# Patient Record
Sex: MALE | Race: WHITE | NOT HISPANIC OR LATINO | Employment: OTHER | URBAN - METROPOLITAN AREA
[De-identification: names, ages, dates, MRNs, and addresses within clinical notes are randomized per-mention and may not be internally consistent; named-entity substitution may affect disease eponyms.]

---

## 2017-03-02 ENCOUNTER — LAB REQUISITION (OUTPATIENT)
Dept: LAB | Facility: HOSPITAL | Age: 67
End: 2017-03-02

## 2017-03-02 DIAGNOSIS — M72.0 PALMAR FASCIAL FIBROMATOSIS: ICD-10-CM

## 2017-03-02 PROCEDURE — 88304 TISSUE EXAM BY PATHOLOGIST: CPT | Performed by: PLASTIC SURGERY

## 2017-03-21 LAB
CYTO UR: NORMAL
LAB AP CASE REPORT: NORMAL
LAB AP CLINICAL INFORMATION: NORMAL
Lab: NORMAL
PATH REPORT.ADDENDUM SPEC: NORMAL
PATH REPORT.FINAL DX SPEC: NORMAL
PATH REPORT.GROSS SPEC: NORMAL

## 2017-08-22 ENCOUNTER — OUTSIDE FACILITY SERVICE (OUTPATIENT)
Dept: GASTROENTEROLOGY | Facility: CLINIC | Age: 67
End: 2017-08-22

## 2017-08-22 PROCEDURE — G0500 MOD SEDAT ENDO SERVICE >5YRS: HCPCS | Performed by: INTERNAL MEDICINE

## 2017-08-22 PROCEDURE — G0105 COLORECTAL SCRN; HI RISK IND: HCPCS | Performed by: INTERNAL MEDICINE

## 2018-11-15 ENCOUNTER — OFFICE VISIT (OUTPATIENT)
Dept: ORTHOPEDIC SURGERY | Facility: CLINIC | Age: 68
End: 2018-11-15

## 2018-11-15 VITALS — HEART RATE: 65 BPM | HEIGHT: 68 IN | WEIGHT: 160.5 LBS | OXYGEN SATURATION: 98 % | BODY MASS INDEX: 24.32 KG/M2

## 2018-11-15 DIAGNOSIS — M17.12 PRIMARY OSTEOARTHRITIS OF LEFT KNEE: ICD-10-CM

## 2018-11-15 DIAGNOSIS — M25.562 ACUTE PAIN OF LEFT KNEE: Primary | ICD-10-CM

## 2018-11-15 DIAGNOSIS — M25.562 LEFT KNEE PAIN, UNSPECIFIED CHRONICITY: ICD-10-CM

## 2018-11-15 PROCEDURE — 20610 DRAIN/INJ JOINT/BURSA W/O US: CPT | Performed by: ORTHOPAEDIC SURGERY

## 2018-11-15 PROCEDURE — 99204 OFFICE O/P NEW MOD 45 MIN: CPT | Performed by: ORTHOPAEDIC SURGERY

## 2018-11-15 RX ORDER — LISINOPRIL 10 MG/1
10 TABLET ORAL DAILY
COMMUNITY
End: 2022-04-28

## 2018-11-15 RX ORDER — POTASSIUM CITRATE 10 MEQ/1
10 TABLET, EXTENDED RELEASE ORAL
COMMUNITY
End: 2019-08-29 | Stop reason: DRUGHIGH

## 2018-11-15 RX ORDER — TRIAMCINOLONE ACETONIDE 40 MG/ML
40 INJECTION, SUSPENSION INTRA-ARTICULAR; INTRAMUSCULAR
Status: COMPLETED | OUTPATIENT
Start: 2018-11-15 | End: 2018-11-15

## 2018-11-15 RX ORDER — LIDOCAINE HYDROCHLORIDE 10 MG/ML
3 INJECTION, SOLUTION INFILTRATION; PERINEURAL
Status: COMPLETED | OUTPATIENT
Start: 2018-11-15 | End: 2018-11-15

## 2018-11-15 RX ORDER — MELOXICAM 7.5 MG/1
7.5 TABLET ORAL DAILY
COMMUNITY

## 2018-11-15 RX ADMIN — TRIAMCINOLONE ACETONIDE 40 MG: 40 INJECTION, SUSPENSION INTRA-ARTICULAR; INTRAMUSCULAR at 15:17

## 2018-11-15 RX ADMIN — LIDOCAINE HYDROCHLORIDE 3 ML: 10 INJECTION, SOLUTION INFILTRATION; PERINEURAL at 15:17

## 2018-11-15 NOTE — PROGRESS NOTES
Procedure   Large Joint Arthrocentesis: L knee  Date/Time: 11/15/2018 3:17 PM  Consent given by: patient  Site marked: site marked  Timeout: Immediately prior to procedure a time out was called to verify the correct patient, procedure, equipment, support staff and site/side marked as required   Supporting Documentation  Indications: pain   Procedure Details  Location: knee - L knee  Preparation: Patient was prepped and draped in the usual sterile fashion  Needle size: 25 G  Approach: anterolateral  Medications administered: 3 mL lidocaine 1 %; 40 mg triamcinolone acetonide 40 MG/ML  Patient tolerance: patient tolerated the procedure well with no immediate complications

## 2018-11-15 NOTE — PROGRESS NOTES
[facility] [place] [facility] Note    Subjective     Pain of the Left Knee (Left knee. Pain for a few months. Tennis injury. Pain stays at knee. Pain with movements, sharp pain when running or playing tennis. )      CHINEDU    Roque Bob is a 68 y.o. male.  Patient is a 68-year-old male well known to me from our relationship regarding his shoulders.  He is here for new problem today regarding his left knee.  This occurred after playing tennis a couple of times after not playing all summer.  He has had difficulty on the medial side of the knee.  He has difficulty with extension and with jogging.  He is tried anti-inflammatories without a lot of relief.  He denies obvious mechanical disruption.  He does have enough knee pain that he has not been able to play tennis comfortably.  He's here for further evaluation and treatment at the request of his PCP Dr. Rafael Jauregui.     History reviewed. No pertinent past medical history.   Past Surgical History:   Procedure Laterality Date   • PROSTATE SURGERY     • ROTATOR CUFF REPAIR        Family History   Problem Relation Age of Onset   • Stroke Mother    • Stroke Father      Social History     Socioeconomic History   • Marital status: Single     Spouse name: Not on file   • Number of children: Not on file   • Years of education: Not on file   • Highest education level: Not on file   Social Needs   • Financial resource strain: Not on file   • Food insecurity - worry: Not on file   • Food insecurity - inability: Not on file   • Transportation needs - medical: Not on file   • Transportation needs - non-medical: Not on file   Occupational History   • Not on file   Tobacco Use   • Smoking status: Never Smoker   • Smokeless tobacco: Never Used   Substance and Sexual Activity   • Alcohol use: No     Frequency: Never   • Drug use: No   • Sexual activity: Defer   Other Topics Concern   • Not on file   Social History Narrative   • Not on file      Current Outpatient Medications on  "File Prior to Visit   Medication Sig Dispense Refill   • lisinopril (PRINIVIL,ZESTRIL) 10 MG tablet Take 10 mg by mouth Daily.     • meloxicam (MOBIC) 7.5 MG tablet Take 7.5 mg by mouth Daily.     • potassium citrate (UROCIT-K) 10 MEQ (1080 MG) CR tablet Take 10 mEq by mouth 3 (Three) Times a Day With Meals.       No current facility-administered medications on file prior to visit.       No Known Allergies     The following portions of the patient's history were reviewed and updated as appropriate: allergies, current medications, past family history, past medical history, past social history, past surgical history and problem list.    Review of Systems   Constitutional: Positive for activity change.   Musculoskeletal: Positive for arthralgias (left knee) and gait problem.   Skin: Negative.    Allergic/Immunologic: Negative.    Hematological: Negative.    Psychiatric/Behavioral: Negative.         Objective      Physical Exam  Pulse 65   Ht 172.7 cm (68\")   Wt 72.8 kg (160 lb 7.9 oz)   SpO2 98%   BMI 24.40 kg/m²     Body mass index is 24.4 kg/m².    General  Mental Status - alert  General Appearance - cooperative, well groomed, not in acute distress  Orientation - Oriented X3  Build & Nutrition - well developed and well nourished  Posture - normal posture  Gait - normal gait     Integumentary  Global Assessment  Examination of related systems reveals - no lymphadenopathy  Ears:  No abnormality  Nose:  No mucous drainage  General Characteristics  Overall examination of the patient's skin reveals - no rashes, no evidence of scars, no suspicious lesions and no bruises.  Color - normal coloration of skin.  Vascular: Brisk capillary refill in all extremities    Ortho Exam  Peripheral Vascular:    Upper Extremity:   Inspection:  Left--no cyanotic nail beds Right--no cyanotic nail beds   Bilateral:  Pink nail beds with brisk capillary refill   Palpation:  Bilateral radial pulse normal    Musculoskeletal:  Global " Assessment:  Overall assessment of Lower Extremity Muscle Strength and Tone:  Left quadriceps--5/5   Left hamstrings--5/5       Left tibialis anterior--5/5  Left gastroc-soleus--5/5  Left EHL --5/5    Lower Extremity:  Knee/Patella:  No digital clubbing or cyanosis.    Examination of left knee reveals:  Normal deep tendon reflexes, coordination, strength, tone, sensation.  No known fractures or deformities.    Inspection and Palpation:  Left knee:  Tenderness:  Over the medial joint line and moderate severity  Effusion:  1+  Crepitus:  Positive  Pulses:  2+  Ecchymosis:  None  Warmth:  None     ROM:  Right:  Extension: 5    Flexion: 120   Left:  Extension: 5     Flexion:120    Instability:    Left:  Lachman Test:  Negative, Varus stress test negative, Valgus stress test negative    Deformities/Malalignments/Discrepancies:    Left:  Genu Varum   Right:  No deformity    Functional Testing:  Rosa's test:  Negative  Patella grind test:  Positive  Q-angle:  normal    Imaging/Studies  Imaging Results (last 24 hours)     Procedure Component Value Units Date/Time    XR Knee 3+ View With Toone Left [[id number]] Resulted:  11/15/18 1542     Updated:  11/15/18 1543    Narrative:       Knee X-Ray    Indication: Pain    Study:  Upright AP, Lateral, and Sunrise views of Left knee(s)    Comparison: None    Findings:    Patient appears to have mild to early moderate degenerative changes in the   medial compartment.  There are mild degenerative changes in the lateral   compartment.  There are mild to early moderate changes in the   patellofemoral compartment.  Patient has overall varus alignment.      Impression: Mild medial compartment and patellofemoral compartment   osteoarthritis of the left knee.              Assessment:  1. Acute pain of left knee    2. Left knee pain, unspecified chronicity    3. Primary osteoarthritis of left knee        Plan:  1. Continue over-the-counter medication as needed for discomfort  2. We have  a long discussion with the patient about treatment options and alternatives.  We have agreed to try a diagnostic injection into the left knee of Kenalog to see if this responds.  If it does not, he very well may have a meniscal injury.  MRI will be necessary in that case.  We have offered a medial offloading brace and he has politely declined saying he has braces at home that he uses.  3. I will see Mr. Bob back in about 6 weeks and see how he is doing.      Medical Decision Making  Management Options : over-the-counter medicine and prescription/IM medicine  Data/Risk: radiology tests and independent visualization of imaging, lab tests, or EMG/NCV    Parish Head MD  11/15/18  7:59 PM

## 2018-12-18 ENCOUNTER — OFFICE VISIT (OUTPATIENT)
Dept: ORTHOPEDIC SURGERY | Facility: CLINIC | Age: 68
End: 2018-12-18

## 2018-12-18 VITALS — WEIGHT: 166.89 LBS | HEART RATE: 71 BPM | BODY MASS INDEX: 25.29 KG/M2 | OXYGEN SATURATION: 99 % | HEIGHT: 68 IN

## 2018-12-18 DIAGNOSIS — M17.12 PRIMARY OSTEOARTHRITIS OF LEFT KNEE: ICD-10-CM

## 2018-12-18 DIAGNOSIS — M25.562 LEFT KNEE PAIN, UNSPECIFIED CHRONICITY: ICD-10-CM

## 2018-12-18 DIAGNOSIS — M25.562 ACUTE PAIN OF LEFT KNEE: Primary | ICD-10-CM

## 2018-12-18 PROCEDURE — 99213 OFFICE O/P EST LOW 20 MIN: CPT | Performed by: ORTHOPAEDIC SURGERY

## 2018-12-18 NOTE — PROGRESS NOTES
"    [facility] [place] [facility] [place]    Subjective     CC: Follow-up (Left knee. 6 week follow up. Last cortisone injection 11/5/18)      HPI    Roque Bob is a 68 y.o. male.  Patient's here for follow-up after left knee injection was given 11/5/2018.  He says he's slightly better but continues to struggle with certain activities.  He continues to play tennis but has not done a lot of jogging yet.       ROS:    Constiutional:Pt denies fever, chills, nausea, or vomiting.  MSK:as above    Objective      Past Medical History  History reviewed. No pertinent past medical history.      Physical Exam  Pulse 71   Ht 172.7 cm (68\")   Wt 75.7 kg (166 lb 14.2 oz)   SpO2 99%   BMI 25.38 kg/m²     Body mass index is 25.38 kg/m².    Patient is well nourished and well developed.        Ortho Exam  Patient has palpable osteophytes medially  Direct medial joint line tenderness and tenderness of the anterior medial femoral condyle  Negative Rosa  1+ effusion    Assessment:  1. Acute pain of left knee    2. Left knee pain, unspecified chronicity    3. Primary osteoarthritis of left knee        Plan:  1. Recommend over the counter anti-inflammatories for pain and/or swelling  2. MRI the left knee will be requested to further evaluate the menisci and condition of the articular cartilage  3. Follow-up to review that study.  We can call the patient with the results of the desires.  He is leaving town for about a month but we may be able to guide treatment when he returns.      Medical Decision Making  Management Options : over-the-counter medicine  Data/Risk: radiology tests    Parish Head MD  12/18/18  7:11 PM  "

## 2018-12-19 ENCOUNTER — HOSPITAL ENCOUNTER (OUTPATIENT)
Dept: MRI IMAGING | Facility: HOSPITAL | Age: 68
Discharge: HOME | End: 2018-12-19
Attending: ORTHOPAEDIC SURGERY | Admitting: ORTHOPAEDIC SURGERY

## 2018-12-19 DIAGNOSIS — M17.12 PRIMARY OSTEOARTHRITIS OF LEFT KNEE: ICD-10-CM

## 2018-12-19 DIAGNOSIS — M25.562 LEFT KNEE PAIN, UNSPECIFIED CHRONICITY: ICD-10-CM

## 2018-12-19 DIAGNOSIS — M25.562 ACUTE PAIN OF LEFT KNEE: ICD-10-CM

## 2018-12-19 PROCEDURE — 73721 MRI JNT OF LWR EXTRE W/O DYE: CPT

## 2018-12-20 ENCOUNTER — OFFICE VISIT (OUTPATIENT)
Dept: ORTHOPEDIC SURGERY | Facility: CLINIC | Age: 68
End: 2018-12-20

## 2018-12-20 VITALS — WEIGHT: 166.89 LBS | OXYGEN SATURATION: 99 % | BODY MASS INDEX: 25.29 KG/M2 | HEART RATE: 72 BPM | HEIGHT: 68 IN

## 2018-12-20 DIAGNOSIS — M17.12 PRIMARY OSTEOARTHRITIS OF LEFT KNEE: ICD-10-CM

## 2018-12-20 DIAGNOSIS — S83.242D TEAR OF MEDIAL MENISCUS OF LEFT KNEE, CURRENT, UNSPECIFIED TEAR TYPE, SUBSEQUENT ENCOUNTER: ICD-10-CM

## 2018-12-20 DIAGNOSIS — M25.562 ACUTE PAIN OF LEFT KNEE: Primary | ICD-10-CM

## 2018-12-20 PROCEDURE — 99213 OFFICE O/P EST LOW 20 MIN: CPT | Performed by: ORTHOPAEDIC SURGERY

## 2018-12-20 NOTE — PROGRESS NOTES
"    [facility] [place] [facility] [place]    Subjective     CC: Follow-up (MRI Left Knee 12/19/18)      HPI    Roque Bob is a 68 y.o. male.  Patient returns after the MRI of his left knee.       ROS:    Constiutional:Pt denies fever, chills, nausea, or vomiting.  MSK:as above    Objective      Past Medical History  History reviewed. No pertinent past medical history.      Physical Exam  Pulse 72   Ht 172.7 cm (67.99\")   Wt 75.7 kg (166 lb 14.2 oz)   SpO2 99%   BMI 25.38 kg/m²     Body mass index is 25.38 kg/m².    Patient is well nourished and well developed.        Ortho Exam  Palpable osteophytes medially  Tenderness at the anterior medial femoral condyle and direct medial joint line  Negative Rosa  1+ effusion    Imaging/Labs/EMG Reviewed:  Review the MRI of the patient's left knee from 12/19/2018 is reviewed through [facility].  Patient has significant edema in the medial femoral condyle and to a lesser degree the medial tibial plateau.  There is complete loss of cartilage on the medial femoral condyle.  There is a degenerative appearing medial meniscal tear.  There is a bone cyst in the posterior medial tibial plateau.    Assessment:  1. Acute pain of left knee    2. Primary osteoarthritis of left knee        Plan:  1. Recommend over the counter anti-inflammatories for pain and/or swelling  2. With a long discussion with the patient today about treatment options and alternatives.  At this point, we will recommend a course of viscosupplementation.  We will attempt to get that approved in the near future.  3. Custom versus over-the-counter medial compartment offloading brace will be prescribed for him to wear during athletic endeavors  4. Follow-up to begin Visco #1      Medical Decision Making  Management Options : over-the-counter medicine and prescription/IM medicine  Data/Risk: radiology tests and independent visualization of imaging, lab tests, or EMG/NCV    Parish Head MD  12/20/18  6:28 " PM

## 2019-02-14 ENCOUNTER — CLINICAL SUPPORT (OUTPATIENT)
Dept: ORTHOPEDIC SURGERY | Facility: CLINIC | Age: 69
End: 2019-02-14

## 2019-02-14 DIAGNOSIS — M25.562 ACUTE PAIN OF LEFT KNEE: ICD-10-CM

## 2019-02-14 DIAGNOSIS — M17.12 PRIMARY OSTEOARTHRITIS OF LEFT KNEE: Primary | ICD-10-CM

## 2019-02-14 PROCEDURE — 20610 DRAIN/INJ JOINT/BURSA W/O US: CPT | Performed by: ORTHOPAEDIC SURGERY

## 2019-02-14 RX ORDER — LIDOCAINE HYDROCHLORIDE 10 MG/ML
3 INJECTION, SOLUTION INFILTRATION; PERINEURAL
Status: COMPLETED | OUTPATIENT
Start: 2019-02-14 | End: 2019-02-14

## 2019-02-14 RX ADMIN — LIDOCAINE HYDROCHLORIDE 3 ML: 10 INJECTION, SOLUTION INFILTRATION; PERINEURAL at 14:25

## 2019-02-14 NOTE — PROGRESS NOTES
Patient is here for Orthovisc No. 1 to the left knee.  This was injected through a superolateral approach and tolerated well.  Follow-up in one week for Orthovisc No. 2.

## 2019-02-14 NOTE — PROGRESS NOTES
Procedure   Large Joint Arthrocentesis: L knee  Date/Time: 2/14/2019 2:25 PM  Consent given by: patient  Site marked: site marked  Timeout: Immediately prior to procedure a time out was called to verify the correct patient, procedure, equipment, support staff and site/side marked as required   Supporting Documentation  Indications: pain   Procedure Details  Location: knee - L knee  Preparation: Patient was prepped and draped in the usual sterile fashion  Needle size: 22 G  Approach: anterolateral  Medications administered: 30 mg Hyaluronan 30 MG/2ML; 3 mL lidocaine 1 %  Patient tolerance: patient tolerated the procedure well with no immediate complications

## 2019-02-21 ENCOUNTER — CLINICAL SUPPORT (OUTPATIENT)
Dept: ORTHOPEDIC SURGERY | Facility: CLINIC | Age: 69
End: 2019-02-21

## 2019-02-21 DIAGNOSIS — M17.12 PRIMARY OSTEOARTHRITIS OF LEFT KNEE: Primary | ICD-10-CM

## 2019-02-21 PROCEDURE — 20610 DRAIN/INJ JOINT/BURSA W/O US: CPT | Performed by: ORTHOPAEDIC SURGERY

## 2019-02-21 RX ORDER — LIDOCAINE HYDROCHLORIDE 10 MG/ML
3 INJECTION, SOLUTION INFILTRATION; PERINEURAL
Status: COMPLETED | OUTPATIENT
Start: 2019-02-21 | End: 2019-02-21

## 2019-02-21 RX ADMIN — LIDOCAINE HYDROCHLORIDE 3 ML: 10 INJECTION, SOLUTION INFILTRATION; PERINEURAL at 11:44

## 2019-02-21 NOTE — PROGRESS NOTES
Procedure   Large Joint Arthrocentesis: L knee  Date/Time: 2/21/2019 11:44 AM  Consent given by: patient  Site marked: site marked  Timeout: Immediately prior to procedure a time out was called to verify the correct patient, procedure, equipment, support staff and site/side marked as required   Supporting Documentation  Indications: pain   Procedure Details  Location: knee - L knee  Preparation: Patient was prepped and draped in the usual sterile fashion  Needle size: 22 G  Medications administered: 30 mg Hyaluronan 30 MG/2ML; 3 mL lidocaine 1 %

## 2019-02-21 NOTE — PROGRESS NOTES
Patient is here for Orthovisc No. 2 to the left knee.  This was injected through a superolateral approach and tolerated well.  Follow-up in 1 week for Orthovisc No. 3.  Patient has yet to see dramatic relief from Orthovisc No. 1.

## 2019-02-28 ENCOUNTER — CLINICAL SUPPORT (OUTPATIENT)
Dept: ORTHOPEDIC SURGERY | Facility: CLINIC | Age: 69
End: 2019-02-28

## 2019-02-28 DIAGNOSIS — M17.12 PRIMARY OSTEOARTHRITIS OF LEFT KNEE: Primary | ICD-10-CM

## 2019-02-28 PROCEDURE — 20610 DRAIN/INJ JOINT/BURSA W/O US: CPT | Performed by: ORTHOPAEDIC SURGERY

## 2019-02-28 RX ORDER — LIDOCAINE HYDROCHLORIDE 10 MG/ML
3 INJECTION, SOLUTION INFILTRATION; PERINEURAL
Status: COMPLETED | OUTPATIENT
Start: 2019-02-28 | End: 2019-02-28

## 2019-02-28 RX ADMIN — LIDOCAINE HYDROCHLORIDE 3 ML: 10 INJECTION, SOLUTION INFILTRATION; PERINEURAL at 14:00

## 2019-02-28 NOTE — PROGRESS NOTES
Patient is here for Orthovisc No. 3 to the left knee.  This is injected there is superolateral approach.  Follow-up in 3 months to assess efficacy of the injection.  We have instructed the patient that it may take 6-8 weeks from his last injection to start seeing some benefit.

## 2019-02-28 NOTE — PROGRESS NOTES
Procedure   Large Joint Arthrocentesis: L knee  Date/Time: 2/28/2019 2:00 PM  Consent given by: patient  Site marked: site marked  Timeout: Immediately prior to procedure a time out was called to verify the correct patient, procedure, equipment, support staff and site/side marked as required   Supporting Documentation  Indications: pain   Procedure Details  Location: knee - L knee  Preparation: Patient was prepped and draped in the usual sterile fashion  Needle size: 22 G  Approach: anterolateral  Medications administered: 30 mg Hyaluronan 30 MG/2ML; 3 mL lidocaine 1 %  Patient tolerance: patient tolerated the procedure well with no immediate complications

## 2019-05-28 ENCOUNTER — OFFICE VISIT (OUTPATIENT)
Dept: ORTHOPEDIC SURGERY | Facility: CLINIC | Age: 69
End: 2019-05-28

## 2019-05-28 VITALS — BODY MASS INDEX: 24.36 KG/M2 | HEART RATE: 61 BPM | HEIGHT: 68 IN | OXYGEN SATURATION: 98 % | WEIGHT: 160.72 LBS

## 2019-05-28 DIAGNOSIS — M25.562 ACUTE PAIN OF LEFT KNEE: ICD-10-CM

## 2019-05-28 DIAGNOSIS — M17.12 PRIMARY OSTEOARTHRITIS OF LEFT KNEE: Primary | ICD-10-CM

## 2019-05-28 DIAGNOSIS — S83.242D TEAR OF MEDIAL MENISCUS OF LEFT KNEE, CURRENT, UNSPECIFIED TEAR TYPE, SUBSEQUENT ENCOUNTER: ICD-10-CM

## 2019-05-28 PROCEDURE — 99213 OFFICE O/P EST LOW 20 MIN: CPT | Performed by: ORTHOPAEDIC SURGERY

## 2019-05-28 NOTE — PROGRESS NOTES
"    [facility] [place] [facility] [place]    Subjective     CC: Follow-up (3 month follow up after Visco injections in Left knee )      CHINEDU Bob is a 69 y.o. male.  Patient returns the office today 3 months out following his Orthovisc series completion on 2/28/2019.  He says it took a while for the injection to help but over the last 3 to 4 weeks, he has felt more relief from the injection and any other time.  He continues to struggle when he plays tennis and change directions and jog.  He did not get much benefit from the over-the-counter medial offloading brace.      ROS:    Constiutional:Pt denies fever, chills, nausea, or vomiting.  MSK:as above    Objective      Past Medical History  No past medical history on file.      Physical Exam  Pulse 61   Ht 172.7 cm (68.01\")   Wt 72.9 kg (160 lb 11.5 oz)   SpO2 98%   BMI 24.43 kg/m²     Body mass index is 24.43 kg/m².    Patient is well nourished and well developed.        Ortho Exam  Peripheral Vascular:    Upper Extremity:   Inspection:  Left--no cyanotic nail beds Right--no cyanotic nail beds   Bilateral:  Pink nail beds with brisk capillary refill   Palpation:  Bilateral radial pulse normal    Musculoskeletal:  Global Assessment:  Overall assessment of Lower Extremity Muscle Strength and Tone:  Left quadriceps--5/5   Left hamstrings--5/5       Left tibialis anterior--5/5  Left gastroc-soleus--5/5  Left EHL --5/5    Lower Extremity:  Knee/Patella:  No digital clubbing or cyanosis.    Examination of left knee reveals:  Normal deep tendon reflexes, coordination, strength, tone, sensation.  No known fractures or deformities.    Inspection and Palpation:  Left knee:  Tenderness:  Over the medial joint line and moderate severity  Effusion:  1+  Crepitus:  Positive  Pulses:  2+  Ecchymosis:  None  Warmth:  None     ROM:  Right:  Extension: 5    Flexion: 120   Left:  Extension: 5     Flexion:120    Instability:    Left:  Lachman Test:  Negative, Varus " stress test negative, Valgus stress test negative    Deformities/Malalignments/Discrepancies:    Left:  Genu Varum   Right:  No deformity    Functional Testing:  Rosa's test:  Negative  Patella grind test:  Positive  Q-angle:  normal      Imaging/Labs/EMG Reviewed:  We have gone back and looked at the MRI and plain films of his knee and he has significant medial compartment disease with edema on both the femoral and tibial sides.  The medial meniscus is significantly degenerative as well.    Assessment:  1. Primary osteoarthritis of left knee    2. Acute pain of left knee    3. Tear of medial meniscus of left knee, current, unspecified tear type, subsequent encounter        Plan:  1. Recommend over the counter anti-inflammatories for pain and/or swelling  2. Osteoarthritis left knee--observe for now.  Disease is primarily medial compartment in location.  We discussed partial versus total knee arthroplasty.  We described and discussed the benefits of each.  We also discussed a custom medial offloading brace to help with exercise.  He will think about that.  Follow-up in 3 months time we will see how he is doing and whether or not repeat modalities need to be performed.  3. Medial meniscal tear left knee--observe for now.  We discussed briefly the possibility of a knee scope but do not think this will give him lasting relief as the arthritis is his likely main problem.  4. I spent 15 minutes face to face with the patient  with 10 minutes spent counseling on future treatment options including partial versus total knee arthroplasty versus arthroscopy.          Parish Head MD  05/28/19  12:43 PM

## 2019-08-29 ENCOUNTER — OFFICE VISIT (OUTPATIENT)
Dept: ORTHOPEDIC SURGERY | Facility: CLINIC | Age: 69
End: 2019-08-29

## 2019-08-29 VITALS — HEIGHT: 68 IN | BODY MASS INDEX: 24.32 KG/M2 | OXYGEN SATURATION: 99 % | HEART RATE: 77 BPM | WEIGHT: 160.5 LBS

## 2019-08-29 DIAGNOSIS — S83.242D TEAR OF MEDIAL MENISCUS OF LEFT KNEE, CURRENT, UNSPECIFIED TEAR TYPE, SUBSEQUENT ENCOUNTER: ICD-10-CM

## 2019-08-29 DIAGNOSIS — M17.12 PRIMARY OSTEOARTHRITIS OF LEFT KNEE: Primary | ICD-10-CM

## 2019-08-29 PROCEDURE — 99213 OFFICE O/P EST LOW 20 MIN: CPT | Performed by: ORTHOPAEDIC SURGERY

## 2019-08-29 RX ORDER — POTASSIUM CITRATE 15 MEQ/1
1 TABLET, EXTENDED RELEASE ORAL 2 TIMES DAILY
Refills: 11 | COMMUNITY
Start: 2019-07-03

## 2019-08-29 NOTE — PROGRESS NOTES
"    [facility] [place] [facility] [place]    Subjective     CC: Follow-up of the Left Knee (Primary Osteoarthritis of Left Knee )      HPI    Roque Bob is a 69 y.o. male.  Patient is here today to follow-up of the left knee arthritis.  His pain is worsened recently.  He still has point tenderness in the medial femoral condyle when he jogs in place tennis.  He has failed an Orthovisc series in terms getting meaningful relief.  Corticosteroid has helped him a little bit.  His last corticosteroid injection was November 2018.  His last Orthovisc injection was 2/28/2019.      ROS:    Constiutional:Pt denies fever, chills, nausea, or vomiting.  MSK:as above    Objective      Past Medical History  History reviewed. No pertinent past medical history.      Physical Exam  Pulse 77   Ht 172.7 cm (67.99\")   Wt 72.8 kg (160 lb 7.9 oz)   SpO2 99%   BMI 24.41 kg/m²     Body mass index is 24.41 kg/m².    Patient is well nourished and well developed.        Ortho Exam  Peripheral Vascular:    Upper Extremity:   Inspection:  Left--no cyanotic nail beds Right--no cyanotic nail beds   Bilateral:  Pink nail beds with brisk capillary refill   Palpation:  Bilateral radial pulse normal    Musculoskeletal:  Global Assessment:  Overall assessment of Lower Extremity Muscle Strength and Tone:  Left quadriceps--5/5   Left hamstrings--5/5       Left tibialis anterior--5/5  Left gastroc-soleus--5/5  Left EHL --5/5    Lower Extremity:  Knee/Patella:  No digital clubbing or cyanosis.    Examination of left knee reveals:  Normal deep tendon reflexes, coordination, strength, tone, sensation.  No known fractures or deformities.    Inspection and Palpation:  Left knee:  Tenderness:  Over the medial joint line and moderate severity  Effusion:  1+  Crepitus:  Positive  Pulses:  2+  Ecchymosis:  None  Warmth:  None     ROM:  Right:  Extension: 5    Flexion: 120   Left:  Extension: 5     Flexion:120    Instability:    Left:  Lachman Test:  " Negative, Varus stress test negative, Valgus stress test negative    Deformities/Malalignments/Discrepancies:    Left:  Genu Varum   Right:  No deformity    Functional Testing:  Rosa's test:  Negative  Patella grind test:  Positive  Q-angle:  normal      Imaging/Labs/EMG Reviewed:  None    Assessment:  1. Primary osteoarthritis of left knee    2. Tear of medial meniscus of left knee, current, unspecified tear type, subsequent encounter        Plan:  1. Recommend over the counter anti-inflammatories for pain and/or swelling  2. Arthritis left knee--patient will be preauthorized for a 5 shot Supartz series.  We will see him back for injection #1.  We briefly discussed the possibility of PRP injections in the future.  He is not interested in a medial compartment offloading brace that we have again discussed with him.  3. Degenerative medial meniscal tear--initially patient was felt to not be a great candidate for the surgery because of the significant degeneration in the medial compartment.  He is not interested in arthroplasty at this point so we will see how he does with the Visco supplement and if he fails to get meaningful relief, we can revisit the possibility of a left knee arthroscopy to clean up the medial sided degenerative structures.      Parish Head MD  08/29/19  10:32 AM

## 2019-09-12 ENCOUNTER — CLINICAL SUPPORT (OUTPATIENT)
Dept: ORTHOPEDIC SURGERY | Facility: CLINIC | Age: 69
End: 2019-09-12

## 2019-09-12 DIAGNOSIS — M17.12 PRIMARY OSTEOARTHRITIS OF LEFT KNEE: ICD-10-CM

## 2019-09-12 PROCEDURE — 20610 DRAIN/INJ JOINT/BURSA W/O US: CPT | Performed by: ORTHOPAEDIC SURGERY

## 2019-09-12 NOTE — PROGRESS NOTES
Procedure   Large Joint Arthrocentesis: L knee  Date/Time: 9/12/2019 9:17 AM  Consent given by: patient  Site marked: site marked  Timeout: Immediately prior to procedure a time out was called to verify the correct patient, procedure, equipment, support staff and site/side marked as required   Supporting Documentation  Indications: pain   Procedure Details  Location: knee - L knee  Preparation: Patient was prepped and draped in the usual sterile fashion  Needle size: 22 G  Approach: anterolateral  Medications administered: 25 mg sodium hyaluronate 25 MG/2.5ML; 3 mL lidocaine (cardiac)  Patient tolerance: patient tolerated the procedure well with no immediate complications

## 2019-09-12 NOTE — PROGRESS NOTES
Patient is here for Supartz No. 1 to the left knee.  This was injected through a superolateral approach and tolerated well.  Follow-up in 1 week for Supartz No. 2.

## 2019-09-19 ENCOUNTER — CLINICAL SUPPORT (OUTPATIENT)
Dept: ORTHOPEDIC SURGERY | Facility: CLINIC | Age: 69
End: 2019-09-19

## 2019-09-19 DIAGNOSIS — M17.12 PRIMARY OSTEOARTHRITIS OF LEFT KNEE: Primary | ICD-10-CM

## 2019-09-19 PROCEDURE — 20610 DRAIN/INJ JOINT/BURSA W/O US: CPT | Performed by: ORTHOPAEDIC SURGERY

## 2019-09-19 RX ORDER — LIDOCAINE HYDROCHLORIDE 10 MG/ML
3 INJECTION, SOLUTION EPIDURAL; INFILTRATION; INTRACAUDAL; PERINEURAL
Status: COMPLETED | OUTPATIENT
Start: 2019-09-19 | End: 2019-09-19

## 2019-09-19 RX ADMIN — LIDOCAINE HYDROCHLORIDE 3 ML: 10 INJECTION, SOLUTION EPIDURAL; INFILTRATION; INTRACAUDAL; PERINEURAL at 09:07

## 2019-09-19 NOTE — PROGRESS NOTES
Patient is here for Supartz No. 2 to the left knee.  This was injected through a superior lateral approach and tolerated well.  Follow-up in 1 week for Supartz No. 3.

## 2019-09-19 NOTE — PROGRESS NOTES
Procedure   Large Joint Arthrocentesis: L knee  Date/Time: 9/19/2019 9:07 AM  Consent given by: patient  Site marked: site marked  Timeout: Immediately prior to procedure a time out was called to verify the correct patient, procedure, equipment, support staff and site/side marked as required   Supporting Documentation  Indications: pain   Procedure Details  Location: knee - L knee  Preparation: Patient was prepped and draped in the usual sterile fashion  Needle size: 22 G  Approach: anterolateral  Medications administered: 25 mg sodium hyaluronate 25 MG/2.5ML; 3 mL lidocaine PF 1% 1 %  Patient tolerance: patient tolerated the procedure well with no immediate complications

## 2019-09-26 ENCOUNTER — CLINICAL SUPPORT (OUTPATIENT)
Dept: ORTHOPEDIC SURGERY | Facility: CLINIC | Age: 69
End: 2019-09-26

## 2019-09-26 DIAGNOSIS — M17.12 PRIMARY OSTEOARTHRITIS OF LEFT KNEE: Primary | ICD-10-CM

## 2019-09-26 PROCEDURE — 20610 DRAIN/INJ JOINT/BURSA W/O US: CPT | Performed by: ORTHOPAEDIC SURGERY

## 2019-09-26 RX ORDER — LIDOCAINE HYDROCHLORIDE 10 MG/ML
3 INJECTION, SOLUTION EPIDURAL; INFILTRATION; INTRACAUDAL; PERINEURAL
Status: COMPLETED | OUTPATIENT
Start: 2019-09-26 | End: 2019-09-26

## 2019-09-26 RX ADMIN — LIDOCAINE HYDROCHLORIDE 3 ML: 10 INJECTION, SOLUTION EPIDURAL; INFILTRATION; INTRACAUDAL; PERINEURAL at 09:15

## 2019-09-26 NOTE — PROGRESS NOTES
Patient is here for Supartz No. 3 to the left knee.  This was injected through a superolateral approach and tolerated well.  Follow-up in 1 week for Supartz No. 4.

## 2019-09-26 NOTE — PROGRESS NOTES
Procedure   Large Joint Arthrocentesis: L knee  Date/Time: 9/26/2019 9:15 AM  Consent given by: patient  Site marked: site marked  Timeout: Immediately prior to procedure a time out was called to verify the correct patient, procedure, equipment, support staff and site/side marked as required   Supporting Documentation  Indications: pain   Procedure Details  Location: knee - L knee  Preparation: Patient was prepped and draped in the usual sterile fashion  Needle size: 22 G  Approach: anterolateral  Medications administered: 25 mg sodium hyaluronate 25 MG/2.5ML; 3 mL lidocaine PF 1% 1 %  Patient tolerance: patient tolerated the procedure well with no immediate complications

## 2019-10-10 ENCOUNTER — CLINICAL SUPPORT (OUTPATIENT)
Dept: ORTHOPEDIC SURGERY | Facility: CLINIC | Age: 69
End: 2019-10-10

## 2019-10-10 DIAGNOSIS — M17.12 PRIMARY OSTEOARTHRITIS OF LEFT KNEE: Primary | ICD-10-CM

## 2019-10-10 DIAGNOSIS — S83.242D TEAR OF MEDIAL MENISCUS OF LEFT KNEE, CURRENT, UNSPECIFIED TEAR TYPE, SUBSEQUENT ENCOUNTER: ICD-10-CM

## 2019-10-10 PROCEDURE — 20610 DRAIN/INJ JOINT/BURSA W/O US: CPT | Performed by: ORTHOPAEDIC SURGERY

## 2019-10-10 NOTE — PROGRESS NOTES
Patient is here for Supartz No. 4 to the left knee.  This was injected through a superior lateral approach and tolerated well.  Patient tells me he was able to run and play tennis so he feels like the Supartz is helping him.  Follow-up in 1 week for the final Supartz injection.

## 2019-10-10 NOTE — PROGRESS NOTES
Procedure   Large Joint Arthrocentesis: L knee  Date/Time: 10/10/2019 9:43 AM  Consent given by: patient  Site marked: site marked  Timeout: Immediately prior to procedure a time out was called to verify the correct patient, procedure, equipment, support staff and site/side marked as required   Supporting Documentation  Indications: pain   Procedure Details  Location: knee - L knee  Preparation: Patient was prepped and draped in the usual sterile fashion  Needle size: 22 G  Approach: anterolateral  Medications administered: 25 mg sodium hyaluronate 25 MG/2.5ML  Patient tolerance: patient tolerated the procedure well with no immediate complications

## 2019-10-17 ENCOUNTER — CLINICAL SUPPORT (OUTPATIENT)
Dept: ORTHOPEDIC SURGERY | Facility: CLINIC | Age: 69
End: 2019-10-17

## 2019-10-17 DIAGNOSIS — M17.12 PRIMARY OSTEOARTHRITIS OF LEFT KNEE: Primary | ICD-10-CM

## 2019-10-17 PROCEDURE — 20610 DRAIN/INJ JOINT/BURSA W/O US: CPT | Performed by: ORTHOPAEDIC SURGERY

## 2019-10-17 RX ORDER — LIDOCAINE HYDROCHLORIDE 10 MG/ML
3 INJECTION, SOLUTION EPIDURAL; INFILTRATION; INTRACAUDAL; PERINEURAL
Status: COMPLETED | OUTPATIENT
Start: 2019-10-17 | End: 2019-10-17

## 2019-10-17 RX ADMIN — LIDOCAINE HYDROCHLORIDE 3 ML: 10 INJECTION, SOLUTION EPIDURAL; INFILTRATION; INTRACAUDAL; PERINEURAL at 09:12

## 2019-10-17 NOTE — PROGRESS NOTES
Patient is here for Supartz No. 5 to the left knee.  This was injected through a superior lateral approach and tolerated well.  Patient will follow-up in 6 weeks and will try making decision whether or not he wants to proceed with arthroscopy.

## 2019-10-17 NOTE — PROGRESS NOTES
Procedure   Large Joint Arthrocentesis: L knee  Date/Time: 10/17/2019 9:12 AM  Consent given by: patient  Site marked: site marked  Timeout: Immediately prior to procedure a time out was called to verify the correct patient, procedure, equipment, support staff and site/side marked as required   Supporting Documentation  Indications: pain   Procedure Details  Location: knee - L knee  Preparation: Patient was prepped and draped in the usual sterile fashion  Needle size: 22 G  Approach: anterolateral  Medications administered: 25 mg sodium hyaluronate 25 MG/2.5ML; 3 mL lidocaine PF 1% 1 %  Patient tolerance: patient tolerated the procedure well with no immediate complications

## 2019-12-05 ENCOUNTER — OFFICE VISIT (OUTPATIENT)
Dept: ORTHOPEDIC SURGERY | Facility: CLINIC | Age: 69
End: 2019-12-05

## 2019-12-05 VITALS — HEIGHT: 68 IN | HEART RATE: 69 BPM | BODY MASS INDEX: 24.16 KG/M2 | OXYGEN SATURATION: 99 % | WEIGHT: 159.4 LBS

## 2019-12-05 DIAGNOSIS — S83.242D TEAR OF MEDIAL MENISCUS OF LEFT KNEE, CURRENT, UNSPECIFIED TEAR TYPE, SUBSEQUENT ENCOUNTER: ICD-10-CM

## 2019-12-05 DIAGNOSIS — M17.12 PRIMARY OSTEOARTHRITIS OF LEFT KNEE: Primary | ICD-10-CM

## 2019-12-05 PROCEDURE — 99213 OFFICE O/P EST LOW 20 MIN: CPT | Performed by: ORTHOPAEDIC SURGERY

## 2019-12-05 NOTE — PROGRESS NOTES
"    [facility] [place] [facility] [place]    Subjective     CC: Follow-up (7 week f/u; Orthopedic Surgery Primary osteoarthritis of left knee (Supartz series completed 10/17/19))      CHINEDU Bob is a 69 y.o. male.  Patient is here today for follow-up now 7 weeks out from completion of a 5's shot Supartz series which helped him.  He still having difficulty on the medial side of the knee without significant mechanical disturbance.      ROS:    Constiutional:Pt denies fever, chills, nausea, or vomiting.  MSK:as above    Objective      Past Medical History  History reviewed. No pertinent past medical history.      Physical Exam  Pulse 69   Ht 172.7 cm (67.99\")   Wt 72.3 kg (159 lb 6.4 oz)   SpO2 99%   BMI 24.24 kg/m²     Body mass index is 24.24 kg/m².    Patient is well nourished and well developed.        Ortho Exam  Peripheral Vascular:    Upper Extremity:   Inspection:  Left--no cyanotic nail beds Right--no cyanotic nail beds   Bilateral:  Pink nail beds with brisk capillary refill   Palpation:  Bilateral radial pulse normal    Musculoskeletal:  Global Assessment:  Overall assessment of Lower Extremity Muscle Strength and Tone:  Left quadriceps--5/5   Left hamstrings--5/5       Left tibialis anterior--5/5  Left gastroc-soleus--5/5  Left EHL --5/5    Lower Extremity:  Knee/Patella:  No digital clubbing or cyanosis.    Examination of left knee reveals:  Normal deep tendon reflexes, coordination, strength, tone, sensation.  No known fractures or deformities.    Inspection and Palpation:  Left knee:  Tenderness:  Over the medial joint line and moderate severity  Effusion:  1+  Crepitus:  Positive  Pulses:  2+  Ecchymosis:  None  Warmth:  None     ROM:  Left:  Extension: 5     Flexion:120    Instability:    Left:  Lachman Test:  Negative, Varus stress test negative, Valgus stress test negative    Deformities/Malalignments/Discrepancies:    Left:  Genu Varum   Right:  No deformity    Functional " Testing:  Rosa's test:  Negative  Patella grind test:  Positive  Q-angle:  normal      Imaging/Labs/EMG Reviewed:  Imaging Results (Last 24 Hours)     ** No results found for the last 24 hours. **      Will come back and looked at the patient's MRI which can show significant degenerative changes in the medial compartment and an irregular medial meniscus.    Assessment:  1. Primary osteoarthritis of left knee    2. Tear of medial meniscus of left knee, current, unspecified tear type, subsequent encounter        Plan:  1. Recommend over the counter anti-inflammatories for pain and/or swelling  2. Left knee medial meniscal tear in the face of medial compartment arthritis--we have talked to the patient very frankly today about the benefits of arthroscopy and in terms of giving him long-term relief, I am not sure that this is enough.  We discussed ankit the possibility of medial compartment arthroplasty and I think that is a better surgery for him given his level of activity and demand (running and playing tennis).  I have asked him to see Dr. Fuller for consideration of medial compartment arthroplasty.  He will try to get this done sometime in the next few weeks and plan potentially for arthroplasty in March.      Parish Head MD  12/05/19  1:33 PM

## 2020-01-15 ENCOUNTER — TELEPHONE (OUTPATIENT)
Dept: ORTHOPEDIC SURGERY | Facility: CLINIC | Age: 70
End: 2020-01-15

## 2020-01-15 NOTE — TELEPHONE ENCOUNTER
PT CALLED THIS AFTERNOON TO LET DR. DA SILVA KNOW THAT HE HAS DECIDED TO HOLD OFF ON GETTING THE PARTIAL KNEE REPLACEMENT. HE WANTS TO DO ANOTHER ROUND OF SUPARTZ WHENEVER HE IS ELIGIBLE, WILL BE AROUND 4/17/2020.  I ADVISED PATIENT THAT WE WILL NEED TO GET IT AUTHORIZED WITH INSURANCE PRIOR TO SCHEDULING THOSE APPOINTMENTS. PATIENT SCHEDULED AN APPOINTMENT AT THE END OF MARCH AND THEN WILL PROCEED WITH GETTING THAT AUTHORIZED. HE JUST WANTED TO LET DR. DA SILVA KNOW THIS INFORMATION.

## 2020-03-17 DIAGNOSIS — M17.12 PRIMARY OSTEOARTHRITIS OF LEFT KNEE: Primary | ICD-10-CM

## 2020-03-17 DIAGNOSIS — S83.242D TEAR OF MEDIAL MENISCUS OF LEFT KNEE, CURRENT, UNSPECIFIED TEAR TYPE, SUBSEQUENT ENCOUNTER: ICD-10-CM

## 2020-03-30 ENCOUNTER — APPOINTMENT (OUTPATIENT)
Dept: MRI IMAGING | Facility: HOSPITAL | Age: 70
End: 2020-03-30

## 2020-04-01 ENCOUNTER — HOSPITAL ENCOUNTER (OUTPATIENT)
Dept: MRI IMAGING | Facility: HOSPITAL | Age: 70
Discharge: HOME | End: 2020-04-01
Admitting: ORTHOPAEDIC SURGERY

## 2020-04-01 DIAGNOSIS — S83.242D TEAR OF MEDIAL MENISCUS OF LEFT KNEE, CURRENT, UNSPECIFIED TEAR TYPE, SUBSEQUENT ENCOUNTER: ICD-10-CM

## 2020-04-01 DIAGNOSIS — M17.12 PRIMARY OSTEOARTHRITIS OF LEFT KNEE: ICD-10-CM

## 2020-04-01 PROCEDURE — 73721 MRI JNT OF LWR EXTRE W/O DYE: CPT

## 2020-04-02 ENCOUNTER — TELEPHONE (OUTPATIENT)
Dept: ORTHOPEDIC SURGERY | Facility: CLINIC | Age: 70
End: 2020-04-02

## 2020-04-02 NOTE — TELEPHONE ENCOUNTER
Left message for the patient to call me back at the office to schedule a telephone visit at 9:20 with Dr. Head tomorrow am.     Judy

## 2020-04-02 NOTE — TELEPHONE ENCOUNTER
PATIENT WAS RETURNING A CALL TO DR DA SILVA REGARDING MRI RESULTS. HE STATES HE RECEIVED A VOICEMAIL BUT NO DETAILS OF THE MRI WERE GIVEN. PATIENT CAN BE REACHED [phone].

## 2020-04-02 NOTE — TELEPHONE ENCOUNTER
Telephone encounter scheduled with the patient to get his MRI results - tomorrow at 9:20 with Dr. Head.    Judy

## 2020-04-02 NOTE — TELEPHONE ENCOUNTER
Please arrange a telephone visit (if he consents) for tomorrow am and I will review results with him.      Se BOUDREAUX

## 2020-04-03 ENCOUNTER — OFFICE VISIT (OUTPATIENT)
Dept: ORTHOPEDIC SURGERY | Facility: CLINIC | Age: 70
End: 2020-04-03

## 2020-04-03 DIAGNOSIS — S83.242D TEAR OF MEDIAL MENISCUS OF LEFT KNEE, CURRENT, UNSPECIFIED TEAR TYPE, SUBSEQUENT ENCOUNTER: ICD-10-CM

## 2020-04-03 DIAGNOSIS — M17.12 PRIMARY OSTEOARTHRITIS OF LEFT KNEE: Primary | ICD-10-CM

## 2020-04-03 PROCEDURE — 99213 OFFICE O/P EST LOW 20 MIN: CPT | Performed by: ORTHOPAEDIC SURGERY

## 2020-04-03 NOTE — PROGRESS NOTES
[facility] [place] [facility] [place]        Subjective     You have chosen to receive care through a telephone visit today. Do you consent to use a telephone visit for your medical care today? Yes      CC: Follow-up (4 month  MRI (4/1/20) recheck - Primary osteoarthritis of left knee)      HPI    Roque Bob is a 70 y.o. male.  Telephone visit has been scheduled for the patient to discuss the MRI results.  Patient continues to complain of medial sided knee pain that affects his ability to jog and play tennis.  He most recently in October 2019 completed a 5 shot Supartz series which helped him more than 3 shot series we have done in the past.      ROS:    Constiutional:Pt denies fever, chills, nausea, or vomiting.  MSK:as above        Objective      Past Medical History  History reviewed. No pertinent past medical history.      Telephone Visit Notes:    --Patient continues to have medial sided left knee pain.  --Patient expressed a desire to continue to be active and secondary to the condition of his knee that he can no longer do so  --He is failed bracing and injections and activity modification and anti-inflammatories  --Patient would like to pursue definitive management sometime in the fall.  --We discussed the findings of the MRI and reviewed the images and report ourselves as well.  Report results will be listed below.      FINDINGS: Extensor mechanism intact with quadriceps and patellar tendons  in normal limits. Patellofemoral joint space demonstrates patellar  retinaculum intact without high-grade chondromalacia despite  mild-to-moderate DJD including osteophytosis and minimal joint space  narrowing of cartilage thinning with only trace, if any, effusion  present in the suprapatellar region. No aggressive bone marrow signal of  the patellofemoral joint space with femorotibial joint spaces  demonstrating degenerative bone cysts noted as seen on prior along with  advanced degenerative  changes in the medial compartment where there is  joint space narrowing and cartilage thinning as well as irregularity of  erosions which have progressed from prior comparison 2018 with decreased  reactive edema seen at that time however. ACL and PCL intact. Lateral  meniscus intact. Medial meniscus has complex tearing posterior horn with  similar configuration to prior somewhat extruded into the medial gutter  with minimal adjacent edema. MCL intact. Lateral complex within normal  limits.     IMPRESSION:  Overall similar appearance to 2018 comparison examination  with persistent complex tearing posterior horn medial meniscus minimally  extruded into the medial gutter without abnormality of the adjacent  intact MCL however. Cartilage thinning in the medial compartment where  there is advanced degenerative disease including erosions and  degenerative bone cyst which are stable to slightly progressed from  prior comparison, however, decreased reactive edema in these regions of  the femoral and tibial margins from 2018 comparison. No new findings  otherwise identified.      D:  04/01/2020  E:  04/01/2020     This report was finalized on 4/1/2020 3:06 PM by Dr. Lawson Bear.      Assessment    Assessment:  1. Primary osteoarthritis of left knee    2. Tear of medial meniscus of left knee, current, unspecified tear type, subsequent encounter        Plan:  1. Recommend over the counter anti-inflammatories for pain and/or swelling  2. Left knee medial meniscal tear in the face of osteoarthritis--patient's medial compartment appears to be devoid of articular cartilage and there are few areas where there is bony edema.  Compared to the prior study in 2018, these edematous areas are less dramatic.  With that being said, we discussed the pros and cons of partial versus total knee arthroplasty.  He is considering getting this done in the fall.  We can go through the specifics when he returns for Supartz No. 1.  The soonest he  can have it is April 18, 2020.  Order will be placed for Celestine.  When he gets more serious about considering surgery, a full set of x-rays will be necessary as his last that was done in 2018.  I suspect that these will show significant advancement.        This visit has been rescheduled as a phone visit to comply with patient safety concerns in accordance with CDC recommendations. Total time of discussion was 17 minutes.      Parish Head MD  04/03/20  09:46

## 2020-05-21 ENCOUNTER — OFFICE VISIT (OUTPATIENT)
Dept: ORTHOPEDIC SURGERY | Facility: CLINIC | Age: 70
End: 2020-05-21

## 2020-05-21 DIAGNOSIS — M17.12 PRIMARY OSTEOARTHRITIS OF LEFT KNEE: Primary | ICD-10-CM

## 2020-05-21 PROCEDURE — 20610 DRAIN/INJ JOINT/BURSA W/O US: CPT | Performed by: ORTHOPAEDIC SURGERY

## 2020-05-21 RX ORDER — LIDOCAINE HYDROCHLORIDE 10 MG/ML
3 INJECTION, SOLUTION EPIDURAL; INFILTRATION; INTRACAUDAL; PERINEURAL
Status: COMPLETED | OUTPATIENT
Start: 2020-05-21 | End: 2020-05-21

## 2020-05-21 RX ADMIN — LIDOCAINE HYDROCHLORIDE 3 ML: 10 INJECTION, SOLUTION EPIDURAL; INFILTRATION; INTRACAUDAL; PERINEURAL at 14:19

## 2020-05-21 NOTE — PROGRESS NOTES
Patient is here for Orthovisc No. 1 to the left knee.  This was injected through a superolateral protein tolerated well.  Follow-up in 1 week for Orthovisc No. 2.

## 2020-05-21 NOTE — PROGRESS NOTES
Procedure   Large Joint Arthrocentesis: L knee  Date/Time: 5/21/2020 2:19 PM  Consent given by: patient  Site marked: site marked  Timeout: Immediately prior to procedure a time out was called to verify the correct patient, procedure, equipment, support staff and site/side marked as required   Supporting Documentation  Indications: pain   Procedure Details  Location: knee - L knee  Preparation: Patient was prepped and draped in the usual sterile fashion  Needle size: 22 G  Approach: superior  Medications administered: 25 mg sodium hyaluronate 25 MG/2.5ML; 3 mL lidocaine PF 1% 1 %  Aspirate: clear (to verify intraarticular placement of the needle)  Patient tolerance: patient tolerated the procedure well with no immediate complications

## 2020-05-28 ENCOUNTER — CLINICAL SUPPORT (OUTPATIENT)
Dept: ORTHOPEDIC SURGERY | Facility: CLINIC | Age: 70
End: 2020-05-28

## 2020-05-28 DIAGNOSIS — M17.12 PRIMARY OSTEOARTHRITIS OF LEFT KNEE: Primary | ICD-10-CM

## 2020-05-28 PROCEDURE — 20610 DRAIN/INJ JOINT/BURSA W/O US: CPT | Performed by: ORTHOPAEDIC SURGERY

## 2020-05-28 RX ORDER — LIDOCAINE HYDROCHLORIDE 10 MG/ML
3 INJECTION, SOLUTION EPIDURAL; INFILTRATION; INTRACAUDAL; PERINEURAL
Status: COMPLETED | OUTPATIENT
Start: 2020-05-28 | End: 2020-05-28

## 2020-05-28 RX ADMIN — LIDOCAINE HYDROCHLORIDE 3 ML: 10 INJECTION, SOLUTION EPIDURAL; INFILTRATION; INTRACAUDAL; PERINEURAL at 09:02

## 2020-05-28 NOTE — PROGRESS NOTES
Patient is here for Supartz No. 2 to the left knee.  This was injected there is superolateral protein tolerated well.  The documentation from 5/21/2020 is erroneous and should read Supartz No. 1 rather than Orthovisc No. 1.  When the patient returns next week for Supartz No. 3, we will get him in touch with Clair for TKA scheduling.

## 2020-05-28 NOTE — PROGRESS NOTES
Procedure   Large Joint Arthrocentesis: L knee  Date/Time: 5/28/2020 9:02 AM  Consent given by: patient  Site marked: site marked  Timeout: Immediately prior to procedure a time out was called to verify the correct patient, procedure, equipment, support staff and site/side marked as required   Supporting Documentation  Indications: pain   Procedure Details  Location: knee - L knee  Preparation: Patient was prepped and draped in the usual sterile fashion  Needle size: 22 G  Approach: anterolateral  Medications administered: 25 mg sodium hyaluronate 25 MG/2.5ML; 3 mL lidocaine PF 1% 1 %  Patient tolerance: patient tolerated the procedure well with no immediate complications

## 2020-06-04 ENCOUNTER — CLINICAL SUPPORT (OUTPATIENT)
Dept: ORTHOPEDIC SURGERY | Facility: CLINIC | Age: 70
End: 2020-06-04

## 2020-06-04 DIAGNOSIS — M17.12 PRIMARY OSTEOARTHRITIS OF LEFT KNEE: Primary | ICD-10-CM

## 2020-06-04 PROCEDURE — 20610 DRAIN/INJ JOINT/BURSA W/O US: CPT | Performed by: ORTHOPAEDIC SURGERY

## 2020-06-04 RX ORDER — LIDOCAINE HYDROCHLORIDE 10 MG/ML
3 INJECTION, SOLUTION EPIDURAL; INFILTRATION; INTRACAUDAL; PERINEURAL
Status: COMPLETED | OUTPATIENT
Start: 2020-06-04 | End: 2020-06-04

## 2020-06-04 RX ADMIN — LIDOCAINE HYDROCHLORIDE 3 ML: 10 INJECTION, SOLUTION EPIDURAL; INFILTRATION; INTRACAUDAL; PERINEURAL at 09:01

## 2020-06-04 NOTE — PROGRESS NOTES
Procedure   Large Joint Arthrocentesis: L knee  Date/Time: 6/4/2020 9:01 AM  Consent given by: patient  Site marked: site marked  Timeout: Immediately prior to procedure a time out was called to verify the correct patient, procedure, equipment, support staff and site/side marked as required   Supporting Documentation  Indications: pain   Procedure Details  Location: knee - L knee  Preparation: Patient was prepped and draped in the usual sterile fashion  Needle size: 22 G  Approach: anterolateral  Medications administered: 25 mg sodium hyaluronate 25 MG/2.5ML; 3 mL lidocaine PF 1% 1 %  Patient tolerance: patient tolerated the procedure well with no immediate complications

## 2020-06-04 NOTE — PROGRESS NOTES
Patient is here for Supartz No. 3 to the left knee.  This was injected through a superior lateral approach.  Injection was tolerated well.  Patient will meet Clair today for consideration of TKA scheduling.  Follow-up in 1 week for Celestine No. 4.

## 2020-06-11 ENCOUNTER — CLINICAL SUPPORT (OUTPATIENT)
Dept: ORTHOPEDIC SURGERY | Facility: CLINIC | Age: 70
End: 2020-06-11

## 2020-06-11 DIAGNOSIS — M17.12 PRIMARY OSTEOARTHRITIS OF LEFT KNEE: Primary | ICD-10-CM

## 2020-06-11 PROCEDURE — 20610 DRAIN/INJ JOINT/BURSA W/O US: CPT | Performed by: ORTHOPAEDIC SURGERY

## 2020-06-11 RX ORDER — LIDOCAINE HYDROCHLORIDE 10 MG/ML
3 INJECTION, SOLUTION EPIDURAL; INFILTRATION; INTRACAUDAL; PERINEURAL
Status: COMPLETED | OUTPATIENT
Start: 2020-06-11 | End: 2020-06-11

## 2020-06-11 RX ADMIN — LIDOCAINE HYDROCHLORIDE 3 ML: 10 INJECTION, SOLUTION EPIDURAL; INFILTRATION; INTRACAUDAL; PERINEURAL at 13:45

## 2020-06-11 NOTE — PROGRESS NOTES
Patient is here for Supartz No. 4 to the left knee.  Follow-up in 1 week for Supartz No. 5.  He is meeting with Clair today.

## 2020-06-11 NOTE — PROGRESS NOTES
Procedure   Large Joint Arthrocentesis: L knee  Date/Time: 6/11/2020 1:45 PM  Consent given by: patient  Site marked: site marked  Timeout: Immediately prior to procedure a time out was called to verify the correct patient, procedure, equipment, support staff and site/side marked as required   Supporting Documentation  Indications: pain   Procedure Details  Location: knee - L knee  Preparation: Patient was prepped and draped in the usual sterile fashion  Needle size: 22 G  Approach: anterolateral  Medications administered: 25 mg sodium hyaluronate 25 MG/2.5ML; 3 mL lidocaine PF 1% 1 %  Patient tolerance: patient tolerated the procedure well with no immediate complications

## 2020-06-18 ENCOUNTER — CLINICAL SUPPORT (OUTPATIENT)
Dept: ORTHOPEDIC SURGERY | Facility: CLINIC | Age: 70
End: 2020-06-18

## 2020-06-18 DIAGNOSIS — M17.12 PRIMARY OSTEOARTHRITIS OF LEFT KNEE: Primary | ICD-10-CM

## 2020-06-18 PROCEDURE — 20610 DRAIN/INJ JOINT/BURSA W/O US: CPT | Performed by: ORTHOPAEDIC SURGERY

## 2020-06-18 RX ORDER — LIDOCAINE HYDROCHLORIDE 10 MG/ML
3 INJECTION, SOLUTION EPIDURAL; INFILTRATION; INTRACAUDAL; PERINEURAL
Status: COMPLETED | OUTPATIENT
Start: 2020-06-18 | End: 2020-06-18

## 2020-06-18 RX ADMIN — LIDOCAINE HYDROCHLORIDE 3 ML: 10 INJECTION, SOLUTION EPIDURAL; INFILTRATION; INTRACAUDAL; PERINEURAL at 09:07

## 2020-06-18 NOTE — PROGRESS NOTES
Procedure   Large Joint Arthrocentesis: L knee  Date/Time: 6/18/2020 9:07 AM  Consent given by: patient  Supporting Documentation  Indications: pain   Procedure Details  Location: knee - L knee  Preparation: Patient was prepped and draped in the usual sterile fashion  Needle size: 22 G  Approach: anterolateral  Medications administered: 25 mg sodium hyaluronate 25 MG/2.5ML; 3 mL lidocaine PF 1% 1 %  Patient tolerance: patient tolerated the procedure well with no immediate complications

## 2020-06-18 NOTE — PROGRESS NOTES
Patient is here for Supartz No. 5 to the left knee.  He was injected through a superolateral approach and tolerated well.  I will see him back preoperatively should he elect to undergo left total knee arthroplasty this fall we will evaluate our need for updated x-rays etc.

## 2021-02-05 ENCOUNTER — TELEPHONE (OUTPATIENT)
Dept: ORTHOPEDICS | Facility: OTHER | Age: 71
End: 2021-02-05

## 2021-02-05 NOTE — TELEPHONE ENCOUNTER
Caller: HEATHER WESTBROOK    Relationship to patient: SELF    Best call back number: [phone]    Chief complaint: LEFT KNEE SURGERY    Type of visit:     Requested date: N/A    If rescheduling, when is the original appointment: 2/23/21    Additional notes: PATIENT WANTS TO DISCUSS SURGERY OPTIONS- WORRIED ABOUT HAVING SURGERY BEFORE HE GET COVID VACCINE. WANTS TO CANCEL APPT AS WELL IF HE DOESN'T NEED TO KEEP IT. PLEASE CONTACT PATIENT ABOUT SCHEDULING LEFT KNEE SURGERY. HE HAS QUESTIONS ABOUT THE TIMING- AND PRECAUTIONS AVAILABLE.     CAN CALL ANYTIME- MAY LEAVE VOICEMAIL

## 2021-02-05 NOTE — TELEPHONE ENCOUNTER
I spoke with the patient and advised that if he wants to keep his appointment for 02/23 then he can still speak with Dr. Head and get placed on the schedule for surgery tentatively. I told him that way he has a date in mind and it is placed there so he doesn't have to come in to get the whole surgery information twice. He wanted to know if Clair would be able to give him a call back at some point to discuss the surgery dates and possible dates. I took down his information and gave it to Clair.    [place]

## 2021-02-22 ENCOUNTER — PREP FOR SURGERY (OUTPATIENT)
Dept: OTHER | Facility: HOSPITAL | Age: 71
End: 2021-02-22

## 2021-02-22 DIAGNOSIS — M17.12 PRIMARY OSTEOARTHRITIS OF LEFT KNEE: Primary | ICD-10-CM

## 2021-02-22 RX ORDER — CHLORHEXIDINE GLUCONATE 4 G/100ML
SOLUTION TOPICAL DAILY
Qty: 236 ML | Refills: 0 | Status: SHIPPED | OUTPATIENT
Start: 2021-02-22 | End: 2022-04-05

## 2021-02-22 RX ORDER — OXYCODONE HCL 10 MG/1
10 TABLET, FILM COATED, EXTENDED RELEASE ORAL ONCE
Status: CANCELLED | OUTPATIENT
Start: 2021-02-22 | End: 2021-02-22

## 2021-02-22 RX ORDER — CEFAZOLIN SODIUM 2 G/100ML
2 INJECTION, SOLUTION INTRAVENOUS ONCE
Status: CANCELLED | OUTPATIENT
Start: 2021-02-22 | End: 2021-02-22

## 2021-02-22 RX ORDER — ACETAMINOPHEN 500 MG
1000 TABLET ORAL ONCE
Status: CANCELLED | OUTPATIENT
Start: 2021-02-22 | End: 2021-02-22

## 2021-02-23 ENCOUNTER — OFFICE VISIT (OUTPATIENT)
Dept: ORTHOPEDIC SURGERY | Facility: CLINIC | Age: 71
End: 2021-02-23

## 2021-02-23 VITALS — HEIGHT: 68 IN | OXYGEN SATURATION: 99 % | HEART RATE: 72 BPM | WEIGHT: 153 LBS | BODY MASS INDEX: 23.19 KG/M2

## 2021-02-23 DIAGNOSIS — M17.12 PRIMARY OSTEOARTHRITIS OF LEFT KNEE: Primary | ICD-10-CM

## 2021-02-23 PROCEDURE — 99214 OFFICE O/P EST MOD 30 MIN: CPT | Performed by: ORTHOPAEDIC SURGERY

## 2021-02-23 NOTE — PROGRESS NOTES
"    [facility] [place] [facility] Note        Subjective     CC: Follow-up of the Left Knee (8 month follow up; Primary osteoarthritis of left knee-Supartz series completed 6/18/20)      CHINEDU Bob is a 71 y.o. male.  Patient is here today for follow-up of his left knee arthritis.  Last seen in June 2020 to complete a Supartz series.  He has been running about every third day and plays tennis once a week.    Overall, patient's symptoms are worsening in the left knee.  The tendon seems to bother her more than the jogging.    ROS:    Constiutional:Pt denies fever, chills, nausea, or vomiting.  MSK:as above        Objective      Past Medical History  History reviewed. No pertinent past medical history.      Physical Exam  Pulse 72   Ht 172.7 cm (67.99\")   Wt 69.4 kg (153 lb)   SpO2 99%   BMI 23.27 kg/m²     Body mass index is 23.27 kg/m².    Patient is well nourished and well developed.        Ortho Exam      Musculoskeletal:  Global Assessment:  Overall assessment of Lower Extremity Muscle Strength and Tone:  Left quadriceps--5/5   Left hamstrings--5/5       Left tibialis anterior--5/5  Left gastroc-soleus--5/5  Left EHL --5/5    Lower Extremity:    Inspection and Palpation:  Left knee:  Tenderness:  Over the medial joint line and moderate severity  Effusion:  1+  Crepitus:  Positive  Pulses:  2+  Ecchymosis:  None  Warmth:  None     ROM:  Left:  Extension: 5     Flexion:120    Instability:    Left:    Lachman Test:  Negative   Varus stress test negative  Valgus stress test negative    Deformities/Malalignments/Discrepancies:    Left:  Genu Varum     Functional Testing:  Rosa's test:  Negative  Patella grind test:  Positive  Q-angle:  normal      Imaging/Labs/EMG Reviewed:  Imaging Results (Last 24 Hours)     Procedure Component Value Units Date/Time    XR Knee 4+ View Left [[id number]] Resulted: 02/23/21 1238     Updated: 02/23/21 1240    Narrative:      Knee X-Ray    Indication: Pain    Study:  " Upright AP, Skiers, Lateral, and Sunrise views of Left knee(s)    Comparison: Left knee 11/15/2018    Findings:    Patient appears to have severe degenerative changes in the medial   compartment.  There are osteophytes noted off the medial tibial plateau   medial epicondyle and erosive changes noted at the central weightbearing   aspect of the medial femoral condyle.  There are mild degenerative changes in the lateral compartment.    There are early moderate changes in the patellofemoral compartment.    Patient has overall varus alignment.        Impression:   Severe medial compartment and early moderate patellofemoral compartment   degnerative changes of the knee.  When compared to the prior study, there   has been worsening appearance of the medial compartment disease.              Assessment    Assessment:  1. Primary osteoarthritis of left knee        Plan:  1. Recommend over the counter anti-inflammatories for pain and/or swelling  2. Severe osteoarthritis medial compartment left knee--we had a lengthy discussion with the patient regarding treatment options and alternatives.  At this point, we recommended the patient undergo left total knee arthroplasty.  He is interested in getting this done sometime in April.  We told him that it will take every bit of 12 to 16 weeks for him to recover and to get his strength and range of motion back to the point where he can comfortably entertain the thought of jogging and playing tennis.  The risk, benefits, potential hazards, typical recovery and rehab as well as reasonable alternatives were discussed at length with him this morning.  These included but were not limited to death, amputation, blood loss, blood clot, pulmonary embolism, infection requiring resection arthroplasty, numbness on the anterolateral aspect of the knee, stiffness, dissatisfaction with the result, and the need for further procedures.  He had the opportunity to ask questions and wishes to proceed with  scheduling.  We will get this done as stated above.      Parish Head MD  02/23/21  13:19 TREASURE Burns disclaimer:  Much of this encounter note is an electronic transcription/translation of spoken language to printed text. The electronic translation of spoken language may permit erroneous, or at times, nonsensical words or phrases to be inadvertently transcribed; Although I have reviewed the note for such errors, some may still exist.

## 2021-03-22 ENCOUNTER — TELEPHONE (OUTPATIENT)
Dept: ORTHOPEDIC SURGERY | Facility: CLINIC | Age: 71
End: 2021-03-22

## 2021-03-22 NOTE — TELEPHONE ENCOUNTER
Provider: DR DA SILVA    Caller: PT    Phone Number: [phone]    Reason for Call:PT STATED THAT HE SPOKE WITH BRYAN ABOUT A WEEK AGO AND TOLD HER THAT HE NEEDED TO PUT OFF THE SURGERY. HE DOES NOT WANT TO RESCHD AT THIS TIME , HIS WIFE IS HAVING SOME HEALTH ISSUES  - PT GOT APPT REMINDERS AND WANTED TO MAKE SURE THAT THIS WAS CANCELLED

## 2021-03-25 ENCOUNTER — APPOINTMENT (OUTPATIENT)
Dept: PREADMISSION TESTING | Facility: HOSPITAL | Age: 71
End: 2021-03-25

## 2021-04-04 ENCOUNTER — APPOINTMENT (OUTPATIENT)
Dept: PREADMISSION TESTING | Facility: HOSPITAL | Age: 71
End: 2021-04-04

## 2021-08-04 ENCOUNTER — APPOINTMENT (OUTPATIENT)
Dept: PREADMISSION TESTING | Facility: HOSPITAL | Age: 71
End: 2021-08-04

## 2021-08-06 ENCOUNTER — HOSPITAL ENCOUNTER (OUTPATIENT)
Facility: HOSPITAL | Age: 71
Setting detail: HOSPITAL OUTPATIENT SURGERY
Discharge: HOME | End: 2021-08-06
Attending: UROLOGY | Admitting: PLASTIC SURGERY

## 2021-08-06 ENCOUNTER — LAB REQUISITION (OUTPATIENT)
Dept: LAB | Facility: HOSPITAL | Age: 71
End: 2021-08-06

## 2021-08-06 DIAGNOSIS — M72.0 PALMAR FASCIAL FIBROMATOSIS (DUPUYTREN): ICD-10-CM

## 2021-08-06 PROCEDURE — 88304 TISSUE EXAM BY PATHOLOGIST: CPT | Performed by: PLASTIC SURGERY

## 2021-08-09 LAB
CYTO UR: NORMAL
LAB AP CASE REPORT: NORMAL
LAB AP CLINICAL INFORMATION: NORMAL
PATH REPORT.FINAL DX SPEC: NORMAL
PATH REPORT.GROSS SPEC: NORMAL

## 2021-12-01 ENCOUNTER — APPOINTMENT (OUTPATIENT)
Dept: PREADMISSION TESTING | Facility: HOSPITAL | Age: 71
End: 2021-12-01

## 2021-12-01 PROCEDURE — C9803 HOPD COVID-19 SPEC COLLECT: HCPCS

## 2021-12-01 PROCEDURE — U0005 INFEC AGEN DETEC AMPLI PROBE: HCPCS

## 2021-12-01 PROCEDURE — U0004 COV-19 TEST NON-CDC HGH THRU: HCPCS

## 2021-12-02 ENCOUNTER — ANESTHESIA EVENT (OUTPATIENT)
Dept: PERIOP | Facility: HOSPITAL | Age: 71
End: 2021-12-02

## 2021-12-02 LAB — SARS-COV-2 RNA PNL SPEC NAA+PROBE: NOT DETECTED

## 2021-12-02 RX ORDER — FAMOTIDINE 10 MG/ML
20 INJECTION, SOLUTION INTRAVENOUS ONCE
Status: CANCELLED | OUTPATIENT
Start: 2021-12-02 | End: 2021-12-02

## 2021-12-03 ENCOUNTER — ANESTHESIA (OUTPATIENT)
Dept: PERIOP | Facility: HOSPITAL | Age: 71
End: 2021-12-03

## 2021-12-03 ENCOUNTER — HOSPITAL ENCOUNTER (OUTPATIENT)
Facility: HOSPITAL | Age: 71
Setting detail: HOSPITAL OUTPATIENT SURGERY
Discharge: HOME | End: 2021-12-03
Attending: UROLOGY | Admitting: UROLOGY

## 2021-12-03 VITALS
TEMPERATURE: 97.5 F | BODY MASS INDEX: 22.96 KG/M2 | HEART RATE: 57 BPM | WEIGHT: 155 LBS | SYSTOLIC BLOOD PRESSURE: 140 MMHG | DIASTOLIC BLOOD PRESSURE: 108 MMHG | HEIGHT: 69 IN | OXYGEN SATURATION: 98 % | RESPIRATION RATE: 18 BRPM

## 2021-12-03 DIAGNOSIS — N20.0 KIDNEY STONE: Primary | ICD-10-CM

## 2021-12-03 LAB
POTASSIUM SERPL-SCNC: 4.5 MMOL/L (ref 3.5–5.2)
QT INTERVAL: 432 MS
QTC INTERVAL: 393 MS

## 2021-12-03 PROCEDURE — 25010000002 DEXAMETHASONE PER 1 MG: Performed by: NURSE ANESTHETIST, CERTIFIED REGISTERED

## 2021-12-03 PROCEDURE — 93005 ELECTROCARDIOGRAM TRACING: CPT | Performed by: ANESTHESIOLOGY

## 2021-12-03 PROCEDURE — 93010 ELECTROCARDIOGRAM REPORT: CPT | Performed by: INTERNAL MEDICINE

## 2021-12-03 PROCEDURE — 25010000002 CEFOXITIN PER 1 G: Performed by: NURSE ANESTHETIST, CERTIFIED REGISTERED

## 2021-12-03 PROCEDURE — 25010000002 PROPOFOL 10 MG/ML EMULSION: Performed by: NURSE ANESTHETIST, CERTIFIED REGISTERED

## 2021-12-03 PROCEDURE — 25010000002 FENTANYL CITRATE (PF) 50 MCG/ML SOLUTION: Performed by: NURSE ANESTHETIST, CERTIFIED REGISTERED

## 2021-12-03 PROCEDURE — 25010000002 ONDANSETRON PER 1 MG: Performed by: NURSE ANESTHETIST, CERTIFIED REGISTERED

## 2021-12-03 PROCEDURE — 84132 ASSAY OF SERUM POTASSIUM: CPT | Performed by: ANESTHESIOLOGY

## 2021-12-03 RX ORDER — HYDROMORPHONE HYDROCHLORIDE 1 MG/ML
0.5 INJECTION, SOLUTION INTRAMUSCULAR; INTRAVENOUS; SUBCUTANEOUS
Status: DISCONTINUED | OUTPATIENT
Start: 2021-12-03 | End: 2021-12-03 | Stop reason: HOSPADM

## 2021-12-03 RX ORDER — EPHEDRINE SULFATE 50 MG/ML
INJECTION, SOLUTION INTRAVENOUS AS NEEDED
Status: DISCONTINUED | OUTPATIENT
Start: 2021-12-03 | End: 2021-12-03 | Stop reason: SURG

## 2021-12-03 RX ORDER — GLYCOPYRROLATE 0.2 MG/ML
INJECTION INTRAMUSCULAR; INTRAVENOUS AS NEEDED
Status: DISCONTINUED | OUTPATIENT
Start: 2021-12-03 | End: 2021-12-03 | Stop reason: SURG

## 2021-12-03 RX ORDER — CEFOXITIN 2 G/1
INJECTION, POWDER, FOR SOLUTION INTRAVENOUS AS NEEDED
Status: DISCONTINUED | OUTPATIENT
Start: 2021-12-03 | End: 2021-12-03 | Stop reason: SURG

## 2021-12-03 RX ORDER — FENTANYL CITRATE 50 UG/ML
INJECTION, SOLUTION INTRAMUSCULAR; INTRAVENOUS
Status: DISCONTINUED
Start: 2021-12-03 | End: 2021-12-03 | Stop reason: WASHOUT

## 2021-12-03 RX ORDER — ACETAMINOPHEN 325 MG/1
650 TABLET ORAL ONCE AS NEEDED
Status: DISCONTINUED | OUTPATIENT
Start: 2021-12-03 | End: 2021-12-03 | Stop reason: HOSPADM

## 2021-12-03 RX ORDER — OXYCODONE HYDROCHLORIDE AND ACETAMINOPHEN 5; 325 MG/1; MG/1
1 TABLET ORAL ONCE AS NEEDED
Status: DISCONTINUED | OUTPATIENT
Start: 2021-12-03 | End: 2021-12-03 | Stop reason: HOSPADM

## 2021-12-03 RX ORDER — LIDOCAINE HYDROCHLORIDE 10 MG/ML
0.5 INJECTION, SOLUTION EPIDURAL; INFILTRATION; INTRACAUDAL; PERINEURAL ONCE AS NEEDED
Status: COMPLETED | OUTPATIENT
Start: 2021-12-03 | End: 2021-12-03

## 2021-12-03 RX ORDER — LIDOCAINE HYDROCHLORIDE 10 MG/ML
INJECTION, SOLUTION EPIDURAL; INFILTRATION; INTRACAUDAL; PERINEURAL AS NEEDED
Status: DISCONTINUED | OUTPATIENT
Start: 2021-12-03 | End: 2021-12-03 | Stop reason: SURG

## 2021-12-03 RX ORDER — DOCUSATE SODIUM 100 MG/1
100 CAPSULE, LIQUID FILLED ORAL DAILY PRN
Qty: 30 CAPSULE | Refills: 1 | Status: SHIPPED | OUTPATIENT
Start: 2021-12-03 | End: 2022-04-05

## 2021-12-03 RX ORDER — SODIUM CHLORIDE, SODIUM LACTATE, POTASSIUM CHLORIDE, CALCIUM CHLORIDE 600; 310; 30; 20 MG/100ML; MG/100ML; MG/100ML; MG/100ML
9 INJECTION, SOLUTION INTRAVENOUS CONTINUOUS
Status: DISCONTINUED | OUTPATIENT
Start: 2021-12-03 | End: 2021-12-03 | Stop reason: HOSPADM

## 2021-12-03 RX ORDER — MIDAZOLAM HYDROCHLORIDE 1 MG/ML
0.5 INJECTION INTRAMUSCULAR; INTRAVENOUS
Status: DISCONTINUED | OUTPATIENT
Start: 2021-12-03 | End: 2021-12-03 | Stop reason: HOSPADM

## 2021-12-03 RX ORDER — ONDANSETRON 2 MG/ML
4 INJECTION INTRAMUSCULAR; INTRAVENOUS ONCE AS NEEDED
Status: DISCONTINUED | OUTPATIENT
Start: 2021-12-03 | End: 2021-12-03 | Stop reason: HOSPADM

## 2021-12-03 RX ORDER — IPRATROPIUM BROMIDE AND ALBUTEROL SULFATE 2.5; .5 MG/3ML; MG/3ML
3 SOLUTION RESPIRATORY (INHALATION) ONCE AS NEEDED
Status: DISCONTINUED | OUTPATIENT
Start: 2021-12-03 | End: 2021-12-03 | Stop reason: HOSPADM

## 2021-12-03 RX ORDER — SODIUM CHLORIDE 0.9 % (FLUSH) 0.9 %
10 SYRINGE (ML) INJECTION EVERY 12 HOURS SCHEDULED
Status: DISCONTINUED | OUTPATIENT
Start: 2021-12-03 | End: 2021-12-03 | Stop reason: HOSPADM

## 2021-12-03 RX ORDER — HYDROCODONE BITARTRATE AND ACETAMINOPHEN 7.5; 325 MG/1; MG/1
1 TABLET ORAL EVERY 6 HOURS PRN
Qty: 30 TABLET | Refills: 0 | Status: SHIPPED | OUTPATIENT
Start: 2021-12-03 | End: 2022-04-05

## 2021-12-03 RX ORDER — OXYCODONE AND ACETAMINOPHEN 7.5; 325 MG/1; MG/1
2 TABLET ORAL EVERY 4 HOURS PRN
Status: DISCONTINUED | OUTPATIENT
Start: 2021-12-03 | End: 2021-12-03 | Stop reason: HOSPADM

## 2021-12-03 RX ORDER — FAMOTIDINE 20 MG/1
20 TABLET, FILM COATED ORAL ONCE
Status: COMPLETED | OUTPATIENT
Start: 2021-12-03 | End: 2021-12-03

## 2021-12-03 RX ORDER — PROPOFOL 10 MG/ML
VIAL (ML) INTRAVENOUS AS NEEDED
Status: DISCONTINUED | OUTPATIENT
Start: 2021-12-03 | End: 2021-12-03 | Stop reason: SURG

## 2021-12-03 RX ORDER — FENTANYL CITRATE 50 UG/ML
50 INJECTION, SOLUTION INTRAMUSCULAR; INTRAVENOUS
Status: DISCONTINUED | OUTPATIENT
Start: 2021-12-03 | End: 2021-12-03 | Stop reason: HOSPADM

## 2021-12-03 RX ORDER — ACETAMINOPHEN 650 MG/1
650 SUPPOSITORY RECTAL ONCE AS NEEDED
Status: DISCONTINUED | OUTPATIENT
Start: 2021-12-03 | End: 2021-12-03 | Stop reason: HOSPADM

## 2021-12-03 RX ORDER — FENTANYL CITRATE 50 UG/ML
INJECTION, SOLUTION INTRAMUSCULAR; INTRAVENOUS AS NEEDED
Status: DISCONTINUED | OUTPATIENT
Start: 2021-12-03 | End: 2021-12-03 | Stop reason: SURG

## 2021-12-03 RX ORDER — SODIUM CHLORIDE 0.9 % (FLUSH) 0.9 %
10 SYRINGE (ML) INJECTION AS NEEDED
Status: DISCONTINUED | OUTPATIENT
Start: 2021-12-03 | End: 2021-12-03 | Stop reason: HOSPADM

## 2021-12-03 RX ORDER — DOXYCYCLINE HYCLATE 100 MG/1
100 CAPSULE ORAL DAILY
Qty: 7 CAPSULE | Refills: 0 | Status: SHIPPED | OUTPATIENT
Start: 2021-12-03 | End: 2021-12-10

## 2021-12-03 RX ORDER — DEXAMETHASONE SODIUM PHOSPHATE 4 MG/ML
INJECTION, SOLUTION INTRA-ARTICULAR; INTRALESIONAL; INTRAMUSCULAR; INTRAVENOUS; SOFT TISSUE AS NEEDED
Status: DISCONTINUED | OUTPATIENT
Start: 2021-12-03 | End: 2021-12-03 | Stop reason: SURG

## 2021-12-03 RX ORDER — ONDANSETRON 2 MG/ML
INJECTION INTRAMUSCULAR; INTRAVENOUS AS NEEDED
Status: DISCONTINUED | OUTPATIENT
Start: 2021-12-03 | End: 2021-12-03 | Stop reason: SURG

## 2021-12-03 RX ADMIN — FAMOTIDINE 20 MG: 20 TABLET ORAL at 09:01

## 2021-12-03 RX ADMIN — ONDANSETRON 4 MG: 2 INJECTION INTRAMUSCULAR; INTRAVENOUS at 11:19

## 2021-12-03 RX ADMIN — LIDOCAINE HYDROCHLORIDE 50 MG: 10 INJECTION, SOLUTION EPIDURAL; INFILTRATION; INTRACAUDAL; PERINEURAL at 10:08

## 2021-12-03 RX ADMIN — SODIUM CHLORIDE, POTASSIUM CHLORIDE, SODIUM LACTATE AND CALCIUM CHLORIDE 9 ML/HR: 600; 310; 30; 20 INJECTION, SOLUTION INTRAVENOUS at 08:45

## 2021-12-03 RX ADMIN — GLYCOPYRROLATE 0.2 MCG: 0.2 INJECTION INTRAMUSCULAR; INTRAVENOUS at 10:42

## 2021-12-03 RX ADMIN — EPHEDRINE SULFATE 10 MG: 50 INJECTION INTRAVENOUS at 10:36

## 2021-12-03 RX ADMIN — FENTANYL CITRATE 50 MCG: 50 INJECTION, SOLUTION INTRAMUSCULAR; INTRAVENOUS at 10:06

## 2021-12-03 RX ADMIN — EPHEDRINE SULFATE 10 MG: 50 INJECTION INTRAVENOUS at 10:17

## 2021-12-03 RX ADMIN — CEFOXITIN SODIUM 2 G: 2 POWDER, FOR SOLUTION INTRAVENOUS at 10:06

## 2021-12-03 RX ADMIN — GLYCOPYRROLATE 0.2 MCG: 0.2 INJECTION INTRAMUSCULAR; INTRAVENOUS at 10:40

## 2021-12-03 RX ADMIN — FENTANYL CITRATE 50 MCG: 50 INJECTION, SOLUTION INTRAMUSCULAR; INTRAVENOUS at 10:08

## 2021-12-03 RX ADMIN — LIDOCAINE HYDROCHLORIDE 0.5 ML: 10 INJECTION, SOLUTION EPIDURAL; INFILTRATION; INTRACAUDAL; PERINEURAL at 08:45

## 2021-12-03 RX ADMIN — EPHEDRINE SULFATE 10 MG: 50 INJECTION INTRAVENOUS at 10:38

## 2021-12-03 RX ADMIN — DEXAMETHASONE SODIUM PHOSPHATE 8 MG: 4 INJECTION, SOLUTION INTRA-ARTICULAR; INTRALESIONAL; INTRAMUSCULAR; INTRAVENOUS; SOFT TISSUE at 10:19

## 2021-12-03 RX ADMIN — PROPOFOL 200 MG: 10 INJECTION, EMULSION INTRAVENOUS at 10:08

## 2021-12-03 RX ADMIN — EPHEDRINE SULFATE 10 MG: 50 INJECTION INTRAVENOUS at 10:19

## 2021-12-03 NOTE — ANESTHESIA PROCEDURE NOTES
Airway  Urgency: elective    Date/Time: 12/3/2021 10:08 AM  End Time:12/3/2021 10:09 AM  Airway not difficult    General Information and Staff    Patient location during procedure: OR  CRNA: Goran Villalpando CRNA    Indications and Patient Condition  Indications for airway management: airway protection    Preoxygenated: yes  Mask difficulty assessment: 1 - vent by mask    Final Airway Details  Final airway type: supraglottic airway      Successful airway: I-gel  Size 4    Number of attempts at approach: 1  Assessment: lips, teeth, and gum same as pre-op    Additional Comments  LMA placed without difficulty, ventilation with assist, equal breath sounds and symmetric chest rise and fall

## 2021-12-03 NOTE — OP NOTE
EXTRACORPOREAL SHOCKWAVE LITHOTRIPSY  Procedure Note    Roque Bob  12/3/2021    Pre-op Diagnosis:   Bilateral nephrolithiasis    Post-op Diagnosis:     Bilateral nephrolithiasis    Procedure/CPT® Codes:      Procedure(s):  BILATERAL EXTRACORPOREAL SHOCKWAVE LITHOTRIPSY    Surgeon(s):  Cristiano Foss Jr., MD    Anesthesia: General    Staff:   Circulator: Gail Franoc RN  Scrub Person: Sonam Albrecht  Vendor Representative: Bunny Kaur      Was needed to assist in this case with retraction, exposure, instrument placement.    Estimated Blood Loss: minimal  Urine Voided: * No values recorded between 12/3/2021 10:01 AM and 12/3/2021 11:11 AM *    Specimens:                None      Drains: * No LDAs found *    Findings: Bilateral nephrolithiasis    Complications: None    History: None patient has a history of bilateral nephrolithiasis he understands risk benefits and alternatives for treatment.  He is elected to avoid a surgical therapy.  He wishes to proceed with bilateral shockwave lithotripsy.    Operative Report: After consent is obtained the patient's brought to the operating suite was placed in the supine position.  Anesthesia is induced.  Fluoroscopic guidance was used identify bilateral nephrolithiasis.  There were multiple stones on the right side.  [place] was treated with 1500 shocks with good fragmentation of postop imaging.  The remaining stones in the kidney were treated with the remainder of the shocks with good fragmentation.  On the left side small stone was identified and treated with 3000 shocks with good fragmentation.  Patient tolerated the procedure well.  Anesthesia was reversed.  He was taken to the recovery room in stable condition.    Cristiano Foss Jr., MD     Date: 12/3/2021  Time: 11:11 EST

## 2021-12-03 NOTE — ANESTHESIA POSTPROCEDURE EVALUATION
Patient: Roque Bob    Procedure Summary     Date: 12/03/21 Room / Location:  [facility] [address] [place] [address] [facility]    Anesthesia Start: 1006 Anesthesia Stop: 1130    Procedure: BILATERAL EXTRACORPOREAL SHOCKWAVE LITHOTRIPSY (Bilateral ) Diagnosis:     Surgeons: Cristiano Foss Jr., MD Provider: Rajeev Peña MD    Anesthesia Type: general ASA Status: 2          Anesthesia Type: general    Vitals  No vitals data found for the desired time range.          Post Anesthesia Care and Evaluation    Patient location during evaluation: PACU  Patient participation: complete - patient participated  Level of consciousness: awake and alert  Pain management: adequate  Airway patency: patent  Anesthetic complications: No anesthetic complications  PONV Status: none  Cardiovascular status: hemodynamically stable and acceptable  Respiratory status: nonlabored ventilation, acceptable and nasal cannula  Hydration status: acceptable

## 2021-12-03 NOTE — ANESTHESIA PREPROCEDURE EVALUATION
Anesthesia Evaluation     Patient summary reviewed and Nursing notes reviewed                Airway   Mallampati: II  TM distance: >3 FB  Neck ROM: full  No difficulty expected  Dental - normal exam     Pulmonary - negative pulmonary ROS and normal exam   Cardiovascular - normal exam    (+) hypertension,       Neuro/Psych- negative ROS  GI/Hepatic/Renal/Endo - negative ROS     Musculoskeletal     Abdominal  - normal exam    Bowel sounds: normal.   Substance History - negative use     OB/GYN negative ob/gyn ROS         Other   arthritis,    history of cancer (PROSTATE)                    Anesthesia Plan    ASA 2     general     intravenous induction     Anesthetic plan, all risks, benefits, and alternatives have been provided, discussed and informed consent has been obtained with: patient.    Plan discussed with CRNA.

## 2021-12-03 NOTE — H&P
Pre-Op H&P  Roque Bob  [id number]  1950      Chief complaint: Kidney stones      Subjective:  Patient is a 71 y.o.male presents for scheduled surgery by Dr. Pipo Barnett. He anticipates a BILATERAL EXTRACORPOREAL SHOCKWAVE LITHOTRIPSY today. He has kidney stones since at least June. He denies any flank pain or hematuria.      Review of Systems:  Constitutional-- No fever, chills or sweats. No fatigue.  CV-- No chest pain, palpitation or syncope. +HTN  Resp-- No SOB, cough, hemoptysis  Skin--No rashes or lesions      Allergies: No Known Allergies      Home Meds:  Medications Prior to Admission   Medication Sig Dispense Refill Last Dose   • lisinopril (PRINIVIL,ZESTRIL) 10 MG tablet Take 10 mg by mouth Daily.   12/2/2021 at 0900   • Potassium Citrate ER 15 MEQ (1620 MG) tablet controlled-release Take 1 tablet by mouth 2 (Two) Times a Day.  11 12/2/2021 at 0900   • chlorhexidine (HIBICLENS) 4 % external liquid Apply  topically to the appropriate area as directed Daily. Shower w/ solution for 5 days before surgery. Bring to [facility] to be filled. 236 mL 0    • meloxicam (MOBIC) 7.5 MG tablet Take 7.5 mg by mouth Daily.   11/27/2021   • mupirocin (BACTROBAN) 2 % ointment 1 application into the nostril(s) as directed by provider 2 (Two) Times a Day. Apply to each nostril twice daily for 5 days before surgery. 22 g 0    • ondansetron (ZOFRAN) 8 MG tablet Take 1 tablet by mouth Every 8 (Eight) Hours As Needed. 10 tablet 0    • oxyCODONE-acetaminophen (PERCOCET) 5-325 MG per tablet Take 1 tablet by mouth Every 6 (Six) Hours As Needed for pain 15 tablet 0          PMH:   Past Medical History:   Diagnosis Date   • Arthritis    • Hypertension    • Prostate CA (HCC)      PSH:    Past Surgical History:   Procedure Laterality Date   • PROSTATE SURGERY     • ROTATOR CUFF REPAIR         Immunization History:  Influenza: 2021  Pneumococcal: UTD  Tetanus: Unknown  Covid x3: 2021    Social History:   Tobacco:   Social History  "    Tobacco Use   Smoking Status Never Smoker   Smokeless Tobacco Never Used      Alcohol:     Social History     Substance and Sexual Activity   Alcohol Use No         Physical Exam:/80 (BP Location: Right arm, Patient Position: Lying)   Pulse 54   Temp 97 °F (36.1 °C) (Temporal)   Resp 18   Ht 175.3 cm (69\")   Wt 70.3 kg (155 lb)   SpO2 96%   BMI 22.89 kg/m²       General Appearance:    Alert, cooperative, no distress, appears stated age   Head:    Normocephalic, without obvious abnormality, atraumatic   Lungs:     Clear to auscultation bilaterally, respirations unlabored    Heart:   Regular rate and rhythm, S1 and S2 normal    Abdomen:    Soft without tenderness   Extremities:   Extremities normal, atraumatic, no cyanosis or edema   Skin:   Skin color, texture, turgor normal, no rashes or lesions   Neurologic:   Grossly intact     Results Review:     LABS:  No results found for: WBC, HGB, HCT, MCV, PLT, NEUTROABS, GLUCOSE, BUN, CREATININE, EGFRIFNONA, EGFRIFAFRI, NA, K, CL, CO2, MG, PHOS, CALCIUM, ALBUMIN, AST, ALT, BILITOT    RADIOLOGY:  Imaging Results (Last 72 Hours)     ** No results found for the last 72 hours. **          I reviewed the patient's new clinical results.    Cancer Staging (if applicable)  Cancer Patient: __ yes __no __unknown; If yes, clinical stage T:__ N:__M:__, stage group or __N/A      Impression: Kidney stone      Plan: BILATERAL EXTRACORPOREAL SHOCKWAVE LITHOTRIPSY      VIRGEN Carnes   12/3/2021   09:08 EST  "

## 2022-04-05 ENCOUNTER — OFFICE VISIT (OUTPATIENT)
Dept: ORTHOPEDIC SURGERY | Facility: CLINIC | Age: 72
End: 2022-04-05

## 2022-04-05 VITALS
SYSTOLIC BLOOD PRESSURE: 124 MMHG | HEIGHT: 69 IN | WEIGHT: 161 LBS | BODY MASS INDEX: 23.85 KG/M2 | DIASTOLIC BLOOD PRESSURE: 80 MMHG

## 2022-04-05 DIAGNOSIS — M25.512 LEFT SHOULDER PAIN, UNSPECIFIED CHRONICITY: Primary | ICD-10-CM

## 2022-04-05 DIAGNOSIS — M75.20 TENDINITIS OF LONG HEAD OF BICEPS: ICD-10-CM

## 2022-04-05 DIAGNOSIS — M75.102 ROTATOR CUFF SYNDROME OF LEFT SHOULDER: ICD-10-CM

## 2022-04-05 PROCEDURE — 99214 OFFICE O/P EST MOD 30 MIN: CPT | Performed by: ORTHOPAEDIC SURGERY

## 2022-04-05 RX ORDER — LISINOPRIL 20 MG/1
20 TABLET ORAL DAILY
COMMUNITY
Start: 2022-03-07

## 2022-04-05 NOTE — PROGRESS NOTES
"    [facility] [place] Orthopa[facility] Surgery Clinic Note        Subjective     CC: Pain of the Left Shoulder      HPI    Roque Bob is a 72 y.o. male who presents with new problem of: left shoulder pain.  Onset: atraumatic and gradual in nature. The issue has been ongoing for 3 month(s). Pain is a 5/10 on the pain scale. Pain is described as dull and sharp. Associated symptoms include pain. The pain is worse with lifting, and lying on side; **physical therapy helped* improve the pain. Previous treatments have included: physical therapy.    I have reviewed the following portions of the patient's history:History of Present Illness and review of systems.      Patient is here today for new problem day regarding his left shoulder.  This has been bothering for about 3 months.  No history of any injury.  Has a history of a right shoulder rotator cuff that was repaired arthroscopically by me.  He tells me that the shoulder hurts anteriorly.  Wakes him up every night.  He has had some physical therapy at [facility] in [facility].  Denies biceps muscle belly pain.        ROS:    Constiutional:Pt denies fever, chills, nausea, or vomiting.  MSK:as above        Objective      Past Medical History  Past Medical History:   Diagnosis Date   • Arthritis    • Hypertension    • Prostate CA (HCC)          Physical Exam  /80   Ht 175.3 cm (69\")   Wt 73 kg (161 lb)   BMI 23.78 kg/m²     Body mass index is 23.78 kg/m².    Patient is well nourished and well developed.        Ortho Exam  Musculoskeletal   Upper Extremity   Left Shoulder     Inspection and Palpation:     Medial Border Scapular Tenderness-none    AC Joint Tenderness -none    Sensation is normal    Examination reveals no ecchymosis.        Strength and Tone:    Supraspinatus -4+ out of 5 with pain.  Diminished pain with thumb up versus thumb down testing    External Rotators-5/5    Infraspinatus - 5/5    Subscapularis - 5/5    Deltoid - 5/5     Range of Motion      Left " Shoulder:    Internal Rotation: ROM - L4    External Rotation: AROM - 60 degrees    Elevation through flexion: AROM - 140 degrees        Impingement   Left shoulder    Noonan-Johan impingement test positive    Neer impingement test negative     Functional Testing   Left shoulder    AC crossover adduction test mildly positive    Speeds test positive    Uppercut test negative    O'Briens test negative    Drop arm sign negative    Apprehension relocation negative        Imaging/Labs/EMG Reviewed:  Imaging Results (Last 24 Hours)     Procedure Component Value Units Date/Time    XR Shoulder 2+ View Left [[id number]] Resulted: 04/05/22 1639     Updated: 04/05/22 1640    Narrative:      Left Shoulder X-Ray    Indication: Pain    Study:  AP, axillary lateral, and scapular Y views    Comparison: None    Findings:  No acute fractures are visualized  No bony lesions are visualized.  Normal soft tissue appearance  AC joint: Severe hypertrophic joint space narrowing  Glenohumeral joint: Minimal joint space narrowing  Acromion type: 2      Impression:    No acute bony abnormalities noted  Type II acromion  Severe hypertrophic AC joint space narrowing              Assessment    Assessment:  1. Left shoulder pain, unspecified chronicity    2. Tendinitis of long head of biceps    3. Rotator cuff syndrome of left shoulder        Plan:  1. Recommend over the counter anti-inflammatories for pain and/or swelling  2. Left shoulder pain with rotator cuff syndrome and long of the biceps tendinopathy--patient has positive speeds and seems to have more pain with twisting of the arm with [place]'s testing.  Plan will be for an MRI to evaluate the presence or absence of a rotator cuff tear.  If he does have a tear, he would likely err on getting this fixed before he undergoes arthroplasty of the knee.  If he simply has a condition that may get by with symptomatic treatment only, then we will use the MRI to guide treatment and  modalities.      Parish Head MD  04/05/22  16:57 EDT      Dictated Utilizing Dragon Dictation.

## 2022-04-11 NOTE — TELEPHONE ENCOUNTER
Patient needs prescription for sedation for upcoming MRI. Would like called in to the [facility] pharmacy on file

## 2022-04-12 RX ORDER — DIAZEPAM 5 MG/1
TABLET ORAL
Qty: 1 TABLET | Refills: 0 | Status: SHIPPED | OUTPATIENT
Start: 2022-04-12 | End: 2022-04-28

## 2022-04-14 ENCOUNTER — HOSPITAL ENCOUNTER (OUTPATIENT)
Dept: MRI IMAGING | Facility: HOSPITAL | Age: 72
Discharge: HOME | End: 2022-04-14
Admitting: ORTHOPAEDIC SURGERY

## 2022-04-14 DIAGNOSIS — M75.20 TENDINITIS OF LONG HEAD OF BICEPS: ICD-10-CM

## 2022-04-14 DIAGNOSIS — M25.512 LEFT SHOULDER PAIN, UNSPECIFIED CHRONICITY: ICD-10-CM

## 2022-04-14 DIAGNOSIS — M75.102 ROTATOR CUFF SYNDROME OF LEFT SHOULDER: ICD-10-CM

## 2022-04-14 PROCEDURE — 73221 MRI JOINT UPR EXTREM W/O DYE: CPT

## 2022-04-28 ENCOUNTER — OFFICE VISIT (OUTPATIENT)
Dept: ORTHOPEDIC SURGERY | Facility: CLINIC | Age: 72
End: 2022-04-28

## 2022-04-28 VITALS
WEIGHT: 160 LBS | SYSTOLIC BLOOD PRESSURE: 134 MMHG | BODY MASS INDEX: 23.7 KG/M2 | HEIGHT: 69 IN | DIASTOLIC BLOOD PRESSURE: 82 MMHG

## 2022-04-28 DIAGNOSIS — M75.122 COMPLETE TEAR OF LEFT ROTATOR CUFF, UNSPECIFIED WHETHER TRAUMATIC: Primary | ICD-10-CM

## 2022-04-28 DIAGNOSIS — M75.20 TENDINITIS OF LONG HEAD OF BICEPS: ICD-10-CM

## 2022-04-28 PROCEDURE — 99214 OFFICE O/P EST MOD 30 MIN: CPT | Performed by: ORTHOPAEDIC SURGERY

## 2022-04-28 NOTE — PROGRESS NOTES
"    [facility] [place] [facility] Note        Subjective     CC: Follow-up (3 week MRI (4/14/22) follow up -- Left shoulder pain, unspecified chronicity; Tendinitis of long head of biceps; Rotator cuff syndrome of left shoulder ////)      CHINEDU Bob is a 72 y.o. male.  Patient here today for follow-up of the MRI of his left shoulder.  Continues to struggle with anterior lateral shoulder pain and some mild occasional pain in the biceps.  Did not have biceps muscle belly pain when he initially saw me.    Overall, patient's symptoms are as above.  See note from 4/5/2022 for full history.    ROS:    Constiutional:Pt denies fever, chills, nausea, or vomiting.  MSK:as above        Objective      Past Medical History  Past Medical History:   Diagnosis Date   • Arthritis    • Hypertension    • Knee swelling 1/01/2017    Left knee, mostly   • Prostate CA (HCC)    • Rotator cuff syndrome Right - 10/1/12 / Left - 1/1/22    Surgery on 2/19/13         Physical Exam  /82   Ht 175.3 cm (69.02\")   Wt 72.6 kg (160 lb)   BMI 23.62 kg/m²     Body mass index is 23.62 kg/m².    Patient is well nourished and well developed.        Ortho Exam  Musculoskeletal   Upper Extremity   Left Shoulder     Inspection and Palpation:     Medial Border Scapular Tenderness-none    AC Joint Tenderness -minimal    Sensation is normal    Examination reveals no ecchymosis.        Strength and Tone:    Supraspinatus -4-5 with pain    External Rotators-5/5    Infraspinatus - 5/5    Subscapularis - 5/5 without pain    Deltoid - 5/5     Range of Motion      Left Shoulder:    Internal Rotation: ROM - L4    External Rotation: AROM - 60 degrees    Elevation through flexion: AROM - 140 degrees        Impingement   Left shoulder    Noonan-Johan impingement test positive    Neer impingement test negative     Functional Testing   Left shoulder    AC crossover adduction test negative    Speeds test negative    Uppercut test " negative    O'Briens test negative    Drop arm sign negative    Apprehension relocation negative        Imaging/Labs/EMG Reviewed:  Imaging Results (Last 24 Hours)     ** No results found for the last 24 hours. **          MRI Shoulder Left Without Contrast  Narrative: MRI SHOULDER LEFT WO CONTRAST    Date of Exam: 4/14/2022 7:34 EDT    Indication: Shoulder pain, rotator cuff tear/impingement suspected.  Decreased range of motion.    Comparison: None available.     Technique: Multiplanar multisequence images of the shoulder were performed according to routine shoulder MRI protocol.    FINDINGS:    Rotator cuff:   Supraspinatus tendon: There is a complete, full-thickness, full width tear of the supraspinatus tendon at the insertion site. This tear measures 1.9 cm from anterior posterior by 1.5 cm medial to lateral. There is no significant muscle atrophy. There is   no medial retraction the musculotendinous junction. There is moderate tendinosis of the fibers.  Infraspinatus tendon:  There is moderate tendinosis of the infraspinatus tendon. There is a partial-thickness articular surface tear of the distal fibers involving less than 50% thickness. It measures approximately 1.3 cm from medial to lateral by 1.2 cm   from anterior posterior. There is no muscle atrophy.  Subscapularis tendon: There is severe tendinosis of the subscapularis tendon. Mild reactive edema at the lesser tuberosity. No muscle atrophy.  Teres minor tendon: No tendon abnormality. No significant muscle atrophy.    Glenohumeral joint:  Humeral head is centrally located in glenoid. Physiologic joint fluid present. There is mild joint space narrowing. There is degenerative tearing of the superior and anterior labrum. No large paralabral cysts. Inferior joint capsule is mildly thickened.   It has normal signal intensity.    Biceps tendon:  The long head biceps tendon has normal location within the bicipital groove. The intra-articular portion is  thickened and has increased signal intensity. It does insert appropriately on the labrum.     Acromioclavicular Joint:  Moderate degenerative change. No abnormal bone marrow edema.  No os acromiale.  No significant lateral downsloping of the acromion.    Bone:  No fractures or aggressive osseous lesions. Bone marrow signal intensity is normal.    Miscellaneous:  Small amount subacromial subdeltoid bursal fluid. Moderate-sized subcoracoid bursal collection..  No pathologically enlarged axillary lymph nodes.  Deltoid muscle has normal size and signal intensity.  Impression: 1.here is a moderate-sized full-thickness near full width tear of the distal supraspinatus tendon from the insertion site with severe tendinosis.  2.There is moderate tendinosis infraspinatus tendon with a moderate size partial-thickness articular surface tear of the distal fibers.  3.There is severe tendinosis subscapularis tendon.  4.There is mild arthritis glenohumeral humeral joint with tearing of the superior and anterior labrum.  5.There is moderate arthritis acromioclavicular joint.  6.There is tendinosis of the intra-articular long head biceps tendon.  7.There is a small amount subacromial subdeltoid bursal fluid. There is a moderate-sized of coracoid bursal collection.    Electronically Signed: Angi Bose MD 4/14/2022 20:51 EDT    We reviewed images and report of the MRI above.  For the most part, the subscap appears to be okay.  The upper subscap may be partially tendinopathic.  Long head the biceps is at home.  No dramatic amount of fluid associated in the biceps groove.  There is a full-thickness large tear of the supraspinatus that involves the anterior half of the tendon.    Assessment    Assessment:  1. Complete tear of left rotator cuff, unspecified whether traumatic    2. Tendinitis of long head of biceps        Plan:  1. Recommend over the counter anti-inflammatories for pain and/or swelling  2. Full-thickness left rotator cuff  tear in the face of mild biceps tendinopathy on MRI.  Clinically, having some biceps symptoms but nothing major.--We discussed the treatment options and alternatives with the patient.  At this point, he would like to proceed with definitive management.  He has been through rotator cuff repair on the contralateral side.  The risk, benefits, and potential hazards of left shoulder arthroscopy, assessment of the subscap but we do not anticipate repair there, possible biceps tenodesis, and repair of the superior and possible anterior aspect of the infraspinatus were discussed with him.  We will leave the AC joint alone.  Patient had the opportunity questions and wished to proceed with scheduling ASAP.      Parish Head MD  04/28/22  13:34 EDT      Dictated Utilizing Dragon Dictation.

## 2022-05-24 ENCOUNTER — CLINICAL SUPPORT NO REQUIREMENTS (OUTPATIENT)
Dept: PREADMISSION TESTING | Facility: HOSPITAL | Age: 72
End: 2022-05-24

## 2022-05-24 DIAGNOSIS — M75.20 TENDINITIS OF LONG HEAD OF BICEPS: ICD-10-CM

## 2022-05-24 DIAGNOSIS — M75.122 COMPLETE TEAR OF LEFT ROTATOR CUFF, UNSPECIFIED WHETHER TRAUMATIC: ICD-10-CM

## 2022-05-24 LAB — SARS-COV-2 RNA PNL SPEC NAA+PROBE: NOT DETECTED

## 2022-05-24 PROCEDURE — U0004 COV-19 TEST NON-CDC HGH THRU: HCPCS

## 2022-05-24 PROCEDURE — U0005 INFEC AGEN DETEC AMPLI PROBE: HCPCS

## 2022-05-24 PROCEDURE — C9803 HOPD COVID-19 SPEC COLLECT: HCPCS

## 2022-05-27 ENCOUNTER — OUTSIDE FACILITY SERVICE (OUTPATIENT)
Dept: ORTHOPEDIC SURGERY | Facility: CLINIC | Age: 72
End: 2022-05-27

## 2022-05-27 DIAGNOSIS — Z98.890 S/P COMPLETE REPAIR OF ROTATOR CUFF: Primary | ICD-10-CM

## 2022-05-27 PROCEDURE — 29827 SHO ARTHRS SRG RT8TR CUF RPR: CPT | Performed by: ORTHOPAEDIC SURGERY

## 2022-05-27 RX ORDER — ONDANSETRON 4 MG/1
4 TABLET, FILM COATED ORAL EVERY 8 HOURS PRN
Qty: 10 TABLET | Refills: 1 | Status: SHIPPED | OUTPATIENT
Start: 2022-05-27 | End: 2022-07-14

## 2022-05-27 RX ORDER — DOCUSATE SODIUM 100 MG/1
100 CAPSULE, LIQUID FILLED ORAL 2 TIMES DAILY PRN
Qty: 62 CAPSULE | Refills: 0 | Status: SHIPPED | OUTPATIENT
Start: 2022-05-27 | End: 2022-07-14

## 2022-05-27 RX ORDER — OXYCODONE HYDROCHLORIDE 5 MG/1
5 TABLET ORAL EVERY 6 HOURS PRN
Qty: 30 TABLET | Refills: 0 | Status: SHIPPED | OUTPATIENT
Start: 2022-05-27 | End: 2022-07-14

## 2022-06-14 ENCOUNTER — OFFICE VISIT (OUTPATIENT)
Dept: ORTHOPEDIC SURGERY | Facility: CLINIC | Age: 72
End: 2022-06-14

## 2022-06-14 VITALS — TEMPERATURE: 97.4 F

## 2022-06-14 DIAGNOSIS — M75.122 COMPLETE TEAR OF LEFT ROTATOR CUFF, UNSPECIFIED WHETHER TRAUMATIC: ICD-10-CM

## 2022-06-14 DIAGNOSIS — Z98.890 S/P COMPLETE REPAIR OF ROTATOR CUFF: Primary | ICD-10-CM

## 2022-06-14 PROCEDURE — 99024 POSTOP FOLLOW-UP VISIT: CPT | Performed by: PHYSICIAN ASSISTANT

## 2022-06-14 NOTE — PROGRESS NOTES
[facility] [place] [facility]        Subjective     Post-op (2 weeks s/p (L) shoulder arthroscopy with RCR 05/27/2022)       CHINEDU Bob is a 72 y.o. male.  Patient presents for their initial postop visit following left shoulder arthroscopy with arthroscopic rotator cuff repair performed on the above date by Dr. Head    Pain scale: 5/10.  Patient did remove the pillow approximately 4 days after surgery.  Notes pain predominantly when attempting to sleep.  No reported numbness or tingling.    Patient denies any fever, chills, night sweats or other constitutional symptoms.          Objective      Physical Exam  Temp 97.4 °F (36.3 °C)     There is no height or weight on file to calculate BMI.        Ortho Exam  Peripheral Vascular   Left Upper Extremity    No cyanotic nail beds    Pink nail beds and rapid capillary refill   Palpation    Radial Pulse - Bilaterally normal    Musculoskeletal   Upper Extremity   Left Shoulder    Inspection and palpation:    Tenderness - mild to moderate    Swelling -mild    Sensation - normal    Incision--healing appropriately with sutures in place.  No redness, warmth, drainage or evidence of infection.    Motor: Grossly intact axillary, MSC, radial, ulnar, median, AIN, PIN.    Sensory: Grossly intact to light touch axillary, MSC, radial, ulnar, median nerve distributions.    Vascular: 2+ radial pulse with brisk capillary refill noted and each digit.   ROJM:   Left:   Elevation through flexion: PROM - 60 degrees      Imaging Reviewed:  No new imaging today.      Assessment:  1. S/P complete repair of rotator cuff    2. Complete tear of left rotator cuff, unspecified whether traumatic        Plan:  1. Status post left shoulder arthroscopy with arthroscopic rotator cuff repair--stable.  2. Sutures were removed today.  3. Reviewed arthroscopic images.  4. Patient provided home shoulder exercises.  5. Patient will continue wearing postoperative sling as directed,  this does include sleeping in it.  6. Currently using over-the-counter pain medication as needed.  7. Ordered formal PT to start the week of 6/27/2022 (Results PT in [place])  8. Follow up with Dr. Head in 4 weeks.  9. Questions and concerns answered.      Kaelyn Omer PA-C  06/14/22  10:45 EDT      Dictated Utilizing Dragon Dictation.

## 2022-07-14 ENCOUNTER — OFFICE VISIT (OUTPATIENT)
Dept: ORTHOPEDIC SURGERY | Facility: CLINIC | Age: 72
End: 2022-07-14

## 2022-07-14 DIAGNOSIS — Z98.890 S/P COMPLETE REPAIR OF ROTATOR CUFF: Primary | ICD-10-CM

## 2022-07-14 PROCEDURE — 99024 POSTOP FOLLOW-UP VISIT: CPT | Performed by: ORTHOPAEDIC SURGERY

## 2022-07-14 RX ORDER — ACETAMINOPHEN 325 MG/1
650 TABLET ORAL EVERY 6 HOURS PRN
COMMUNITY

## 2022-07-14 RX ORDER — ACETAMINOPHEN,DIPHENHYDRAMINE HCL 500; 25 MG/1; MG/1
1 TABLET, FILM COATED ORAL NIGHTLY PRN
COMMUNITY
End: 2023-02-23

## 2022-07-14 NOTE — PROGRESS NOTES
[facility] [place] Orthopaedic Surgery Clinic Note        Subjective     Post-op (4 week f/u; 7 weeks s/p (L) shoulder arthroscopy with RCR 05/27/2022)       CHINEDU Bob is a 72 y.o. male.  Patient is 7 weeks out from left shoulder arthroscopy with rotator cuff repair on 5/27/2022.  He is doing his physical therapy at [facility] physical therapy in [place] near [facility].          Objective      Physical Exam  There were no vitals taken for this visit.    There is no height or weight on file to calculate BMI.        Ortho Exam  Peripheral Vascular   Left Upper Extremity    No cyanotic nail beds    Pink nail beds and rapid capillary refill   Palpation    Radial Pulse - Bilaterally normal    Musculoskeletal   Upper Extremity   Left Shoulder    Inspection and palpation:    Tenderness - mild    Swelling - none    Sensation - normal    Incision--healing appropriately, no drainage   ROJM:   Left:   External Rotation: PROM - 30 degrees   Elevation through flexion: PROM - 100 degrees        Imaging Reviewed:  Imaging Results (Last 24 Hours)     ** No results found for the last 24 hours. **            Assessment    Assessment:  1. S/P complete repair of rotator cuff        Plan:  1. Status post left rotator cuff repair--patient can wean from the sling.  He should continue to be very aggressive with his stretching overall.  I think it is okay for him to start jogging and start hitting some tennis balls lightly given that he is right-handed and hits a one handed backhand.  I will see him back in 4 to 6 weeks to reassess range of motion.      Parish Head MD  07/14/22  13:57 EDT      Dictated Utilizing Dragon Dictation.

## 2022-08-11 ENCOUNTER — OFFICE VISIT (OUTPATIENT)
Dept: ORTHOPEDIC SURGERY | Facility: CLINIC | Age: 72
End: 2022-08-11

## 2022-08-11 DIAGNOSIS — M17.12 PRIMARY OSTEOARTHRITIS OF LEFT KNEE: ICD-10-CM

## 2022-08-11 DIAGNOSIS — Z98.890 S/P COMPLETE REPAIR OF ROTATOR CUFF: Primary | ICD-10-CM

## 2022-08-11 DIAGNOSIS — M20.22 HALLUX RIGIDUS OF LEFT FOOT: ICD-10-CM

## 2022-08-11 PROCEDURE — 99024 POSTOP FOLLOW-UP VISIT: CPT | Performed by: ORTHOPAEDIC SURGERY

## 2022-08-11 PROCEDURE — 99213 OFFICE O/P EST LOW 20 MIN: CPT | Performed by: ORTHOPAEDIC SURGERY

## 2022-08-11 RX ORDER — ACETAMINOPHEN,DIPHENHYDRAMINE HCL 500; 25 MG/1; MG/1
1 TABLET, FILM COATED ORAL NIGHTLY PRN
COMMUNITY
End: 2023-02-23

## 2022-08-11 RX ORDER — TRAMADOL HYDROCHLORIDE 50 MG/1
50 TABLET ORAL EVERY 6 HOURS PRN
Qty: 30 TABLET | Refills: 0 | Status: SHIPPED | OUTPATIENT
Start: 2022-08-11 | End: 2023-02-23

## 2022-08-11 NOTE — PROGRESS NOTES
[facility] [place] [facility] Note        Subjective     Post-op (4 week f/u-- 11 weeks s/p (L) shoulder arthroscopy with RCR 05/27/2022)       CHINEDU    Roque Bob is a 72 y.o. male.  Patient here today now almost 3 months out from left shoulder arthroscopy with rotator cuff repair on 5/27/2022.  He is doing therapy 3 days a week at [facility] physical therapy in [place].  They are getting ready to move him to 2 days a week.  He says he is playing some tennis.  Having some trouble sleeping at night.    Patient is also here for follow-up of his left knee arthritis.  This has been acting up lately.  He think about surgery in spring 2023.  He like to try injections.  Last set of injections was June 2020.    Patient is also here for new problem day regarding his left great toe.  He says he is having some stiffness and difficulty with running.          Objective      Physical Exam  There were no vitals taken for this visit.    There is no height or weight on file to calculate BMI.        Ortho Exam  Left great toe is stiff with palpable osteophytes at the MTP.  He is tender to palpation.  Mild swelling.        Musculoskeletal:  Global Assessment:  Overall assessment of Lower Extremity Muscle Strength and Tone:  Left quadriceps--5/5   Left hamstrings--5/5       Left tibialis anterior--5/5  Left gastroc-soleus--5/5  Left EHL --5/5    Lower Extremity:    Inspection and Palpation:  Left knee:  Tenderness:  Over the medial joint line and moderate severity  Effusion:  1+  Crepitus:  Positive  Pulses:  2+  Ecchymosis:  None  Warmth:  None     ROM:  Left:  Extension: 5     Flexion:120    Instability:    Left:    Lachman Test:  Negative   Varus stress test negative  Valgus stress test negative    Deformities/Malalignments/Discrepancies:    Left:  Genu Varum     Functional Testing:  Rosa's test:  Negative  Patella grind test:  Positive  Q-angle:  normal    Musculoskeletal   Upper Extremity   Left Shoulder        Strength and Tone:    Supraspinatus -5 and a 5    External Rotators-5/5    Infraspinatus - 5/5    Subscapularis - 5/5    Deltoid - 5/5     Range of Motion      Left Shoulder:    Internal Rotation: ROM - L4    External Rotation: AROM - 70 degrees    Elevation through flexion: AROM - 140 degrees     AC joint:  non tender to palpation    AC joint:  negative crossover            Imaging Reviewed:  Imaging Results (Last 24 Hours)     ** No results found for the last 24 hours. **            Assessment    Assessment:  1. S/P complete repair of rotator cuff    2. Primary osteoarthritis of left knee    3. Hallux rigidus of left foot        Plan:  1. Status post rotator cuff repair--patient is doing well overall.  Continue physical therapy.  He needs strength more than anything else.  We will get him some tramadol today at his request to hopefully help him sleep better.  2. Osteoarthritis left knee--viscosupplementation injections will be ordered for him.  See him back for injection #1.  He prefers the last appointment of the day if possible.  3. Hallux rigidus left--prescription for Hagan's extension block orthosis will be written for him today.      Parish Head MD  08/11/22  09:59 EDT      Dictated Utilizing Dragon Dictation.

## 2022-08-23 ENCOUNTER — CLINICAL SUPPORT (OUTPATIENT)
Dept: ORTHOPEDIC SURGERY | Facility: CLINIC | Age: 72
End: 2022-08-23

## 2022-08-23 DIAGNOSIS — M17.12 PRIMARY OSTEOARTHRITIS OF LEFT KNEE: Primary | ICD-10-CM

## 2022-08-23 PROCEDURE — 20610 DRAIN/INJ JOINT/BURSA W/O US: CPT | Performed by: ORTHOPAEDIC SURGERY

## 2022-08-23 RX ORDER — LIDOCAINE HYDROCHLORIDE 10 MG/ML
3 INJECTION, SOLUTION EPIDURAL; INFILTRATION; INTRACAUDAL; PERINEURAL
Status: COMPLETED | OUTPATIENT
Start: 2022-08-23 | End: 2022-08-23

## 2022-08-23 RX ADMIN — LIDOCAINE HYDROCHLORIDE 3 ML: 10 INJECTION, SOLUTION EPIDURAL; INFILTRATION; INTRACAUDAL; PERINEURAL at 15:28

## 2022-08-23 NOTE — PROGRESS NOTES
Procedure   Large Joint Arthrocentesis: L knee  Date/Time: 8/23/2022 3:28 PM  Consent given by: patient  Site marked: site marked  Timeout: Immediately prior to procedure a time out was called to verify the correct patient, procedure, equipment, support staff and site/side marked as required   Supporting Documentation  Indications: pain   Procedure Details  Location: knee - L knee  Preparation: Patient was prepped and draped in the usual sterile fashion  Needle size: 22 G  Approach: anterolateral  Medications administered: 30 mg Hyaluronan 30 MG/2ML; 3 mL lidocaine PF 1% 1 %  Patient tolerance: patient tolerated the procedure well with no immediate complications

## 2022-08-30 ENCOUNTER — CLINICAL SUPPORT (OUTPATIENT)
Dept: ORTHOPEDIC SURGERY | Facility: CLINIC | Age: 72
End: 2022-08-30

## 2022-08-30 DIAGNOSIS — M17.12 PRIMARY OSTEOARTHRITIS OF LEFT KNEE: Primary | ICD-10-CM

## 2022-08-30 PROCEDURE — 20610 DRAIN/INJ JOINT/BURSA W/O US: CPT | Performed by: ORTHOPAEDIC SURGERY

## 2022-08-30 RX ORDER — LIDOCAINE HYDROCHLORIDE 10 MG/ML
3 INJECTION, SOLUTION EPIDURAL; INFILTRATION; INTRACAUDAL; PERINEURAL
Status: COMPLETED | OUTPATIENT
Start: 2022-08-30 | End: 2022-08-30

## 2022-08-30 RX ADMIN — LIDOCAINE HYDROCHLORIDE 3 ML: 10 INJECTION, SOLUTION EPIDURAL; INFILTRATION; INTRACAUDAL; PERINEURAL at 10:32

## 2022-09-06 ENCOUNTER — CLINICAL SUPPORT (OUTPATIENT)
Dept: ORTHOPEDIC SURGERY | Facility: CLINIC | Age: 72
End: 2022-09-06

## 2022-09-06 DIAGNOSIS — M17.12 PRIMARY OSTEOARTHRITIS OF LEFT KNEE: Primary | ICD-10-CM

## 2022-09-06 PROCEDURE — 20610 DRAIN/INJ JOINT/BURSA W/O US: CPT | Performed by: ORTHOPAEDIC SURGERY

## 2022-09-06 RX ORDER — LIDOCAINE HYDROCHLORIDE 10 MG/ML
2 INJECTION, SOLUTION EPIDURAL; INFILTRATION; INTRACAUDAL; PERINEURAL
Status: COMPLETED | OUTPATIENT
Start: 2022-09-06 | End: 2022-09-06

## 2022-09-06 RX ADMIN — LIDOCAINE HYDROCHLORIDE 2 ML: 10 INJECTION, SOLUTION EPIDURAL; INFILTRATION; INTRACAUDAL; PERINEURAL at 15:29

## 2022-09-06 NOTE — PROGRESS NOTES
Procedure   Large Joint Arthrocentesis: L knee  Date/Time: 9/6/2022 3:29 PM  Consent given by: patient  Site marked: site marked  Timeout: Immediately prior to procedure a time out was called to verify the correct patient, procedure, equipment, support staff and site/side marked as required   Supporting Documentation  Indications: pain   Procedure Details  Location: knee - L knee  Preparation: Patient was prepped and draped in the usual sterile fashion  Needle size: 22 G  Approach: anterolateral  Medications administered: 2 mL lidocaine PF 1% 1 %; 30 mg Hyaluronan 30 MG/2ML  Patient tolerance: patient tolerated the procedure well with no immediate complications

## 2022-09-22 ENCOUNTER — OFFICE VISIT (OUTPATIENT)
Dept: ORTHOPEDIC SURGERY | Facility: CLINIC | Age: 72
End: 2022-09-22

## 2022-09-22 VITALS
DIASTOLIC BLOOD PRESSURE: 84 MMHG | SYSTOLIC BLOOD PRESSURE: 122 MMHG | HEIGHT: 69 IN | BODY MASS INDEX: 23.25 KG/M2 | WEIGHT: 157 LBS

## 2022-09-22 DIAGNOSIS — Z98.890 S/P COMPLETE REPAIR OF ROTATOR CUFF: Primary | ICD-10-CM

## 2022-09-22 PROCEDURE — 99213 OFFICE O/P EST LOW 20 MIN: CPT | Performed by: ORTHOPAEDIC SURGERY

## 2022-09-22 NOTE — PROGRESS NOTES
"    [facility] [place] Orthopaedic Surgery Clinic Note        Subjective     CC: Follow-up (6 week recheck- 4 months s/p (L) shoulder arthroscopy with RCR 05/27/2022)      CHINEDU Bob is a 72 y.o. male.  Patient is now 4 months out from left shoulder arthroscopy with rotator cuff repair on 5/27/2022.  He says the therapy seems to make his shoulder more sore.  He is able to play tennis.  He is having difficulty sleeping still.    Overall, patient's symptoms are as above.    ROS:    Constiutional:Pt denies fever, chills, nausea, or vomiting.  MSK:as above        Objective      Past Medical History  Past Medical History:   Diagnosis Date   • Arthritis    • Hypertension    • Knee swelling 1/01/2017    Left knee, mostly   • Prostate CA (HCC)    • Rotator cuff syndrome Right - 10/1/12 / Left - 1/1/22    Surgery on 2/19/13         Physical Exam  /84   Ht 175.3 cm (69.02\")   Wt 71.2 kg (157 lb)   BMI 23.17 kg/m²     Body mass index is 23.17 kg/m².    Patient is well nourished and well developed.        Ortho Exam  Musculoskeletal   Upper Extremity   Left Shoulder       Strength and Tone:    Supraspinatus -5 out of 5    External Rotators-5/5    Infraspinatus - 5/5    Subscapularis - 5/5    Deltoid - 5/5     Range of Motion      Left Shoulder:    Internal Rotation: ROM - L4    External Rotation: AROM - 70 degrees    Elevation through flexion: AROM - 140 degrees     AC joint:  non tender to palpation    AC joint:  negative crossover          Imaging/Labs/EMG Reviewed:  Imaging Results (Last 24 Hours)     ** No results found for the last 24 hours. **            Assessment    Assessment:  1. S/P complete repair of rotator cuff        Plan:  1. Recommend over the counter anti-inflammatories for pain and/or swelling  2. Status post left rotator cuff repair--patient is doing well overall from a strength and range of motion standpoint.  Plan will be for graduation from therapy to band work at home and also recommendation for " deep tissue massage to address his trapezial and neck pain.  Certainly if he continues to have difficulty at the 6-month point, reimaging to verify that that cuff has healed would be advisable.  I will see him back in November for his knee we will inquire about his shoulder at that point as well.      Parish Head MD  09/22/22  12:39 EDT      Dictated Utilizing Dragon Dictation.

## 2022-10-07 ENCOUNTER — TELEPHONE (OUTPATIENT)
Dept: ORTHOPEDIC SURGERY | Facility: CLINIC | Age: 72
End: 2022-10-07

## 2022-10-07 NOTE — TELEPHONE ENCOUNTER
Spoke with patient, gave him [facility] Info [phone], he will call back if any additional questions.

## 2022-10-07 NOTE — TELEPHONE ENCOUNTER
Caller: Roque Bob    Relationship to patient: Self    Best call back number: [phone]    Patient is needing: CALLBACK  PATIENT WAS GIVE A WRITTEN SCRIPT FOR Hallux rigidus left--prescription for Hagan's extension block orthosis will be written for him today.  PATIENT CALLED TODAY STATING THAT HE HAS BEEN TO SEVERAL PLACES AND CANNOT FIND ANYONE WHO WILL TAKE/FILL THIS SCRIPT  PATIENT IS WANTING THE OFFICE TO GIVE HIM SOME DIRECTION ON HOW TO GO ABOUT GETTING THIS FILLED         UNABLE TO WARM TRANSFER

## 2022-11-10 ENCOUNTER — OFFICE VISIT (OUTPATIENT)
Dept: ORTHOPEDIC SURGERY | Facility: CLINIC | Age: 72
End: 2022-11-10

## 2022-11-10 VITALS
HEIGHT: 69 IN | SYSTOLIC BLOOD PRESSURE: 118 MMHG | DIASTOLIC BLOOD PRESSURE: 78 MMHG | WEIGHT: 157.8 LBS | BODY MASS INDEX: 23.37 KG/M2

## 2022-11-10 DIAGNOSIS — Z98.890 S/P COMPLETE REPAIR OF ROTATOR CUFF: Primary | ICD-10-CM

## 2022-11-10 DIAGNOSIS — M17.12 PRIMARY OSTEOARTHRITIS OF LEFT KNEE: ICD-10-CM

## 2022-11-10 PROCEDURE — 99213 OFFICE O/P EST LOW 20 MIN: CPT | Performed by: ORTHOPAEDIC SURGERY

## 2022-11-10 NOTE — PROGRESS NOTES
"    [facility] [place] Orthopaedic Surgery Clinic Note        Subjective     CC: Follow-up (2 month follow up; s/p (L) shoulder arthroscopy with RCR 05/27/2022)      CHINEDU Bob is a 72 y.o. male.  Patient returns for follow-up of his left shoulder.  He is status post rotator cuff repair on 5/27/2022.  He is doing well overall.  He states there is still some limitations but overall functionally he is doing quite well and starting to lift weights.    Overall, patient's symptoms are as above.    ROS:    Constiutional:Pt denies fever, chills, nausea, or vomiting.  MSK:as above        Objective      Past Medical History  Past Medical History:   Diagnosis Date   • Arthritis    • Hypertension    • Knee swelling 1/01/2017    Left knee, mostly   • Prostate CA (HCC)    • Rotator cuff syndrome Right - 10/1/12 / Left - 1/1/22    Surgery on 2/19/13         Physical Exam  /78   Ht 175.3 cm (69.02\")   Wt 71.6 kg (157 lb 12.8 oz)   BMI 23.29 kg/m²     Body mass index is 23.29 kg/m².    Patient is well nourished and well developed.        Ortho Exam  Musculoskeletal   Upper Extremity   Left Shoulder       Strength and Tone:    Supraspinatus -5-5    External Rotators-5/5    Infraspinatus - 5/5    Subscapularis - 5/5    Deltoid - 5/5     Range of Motion      Left Shoulder:    Internal Rotation: ROM - L4    External Rotation: AROM - 70 degrees    Elevation through flexion: AROM - 140 degrees     AC joint:  non tender to palpation    AC joint:  negative crossover          Imaging/Labs/EMG Reviewed:  Imaging Results (Last 24 Hours)     ** No results found for the last 24 hours. **            Assessment    Assessment:  1. S/P complete repair of rotator cuff    2. Primary osteoarthritis of left knee        Plan:  1. Recommend over the counter anti-inflammatories for pain and/or swelling  2. Status post rotator cuff repair--patient is doing great overall.  We have given him the greenlight to perform activity as tolerated.  3. Left " knee arthritis--patient will return in a few weeks to discuss this further.  He is considering intervention sometime in early March but we can discuss that at the time he comes back.  He will need x-rays when he returns.      Parish Head MD  11/10/22  17:47 EST      Dictated Utilizing Dragon Dictation.

## 2022-11-14 ENCOUNTER — PREP FOR SURGERY (OUTPATIENT)
Dept: OTHER | Facility: HOSPITAL | Age: 72
End: 2022-11-14

## 2022-11-14 DIAGNOSIS — M17.12 PRIMARY OSTEOARTHRITIS OF LEFT KNEE: Primary | ICD-10-CM

## 2022-11-14 RX ORDER — CEFAZOLIN SODIUM 2 G/100ML
2 INJECTION, SOLUTION INTRAVENOUS ONCE
Status: CANCELLED | OUTPATIENT
Start: 2022-11-14 | End: 2022-11-14

## 2022-11-14 RX ORDER — CHLORHEXIDINE GLUCONATE 4 G/100ML
SOLUTION TOPICAL DAILY
Qty: 236 ML | Refills: 0 | Status: SHIPPED | OUTPATIENT
Start: 2022-11-14 | End: 2023-02-23

## 2022-11-14 RX ORDER — TRANEXAMIC ACID 10 MG/ML
1000 INJECTION, SOLUTION INTRAVENOUS ONCE
Status: CANCELLED | OUTPATIENT
Start: 2022-11-14 | End: 2022-11-14

## 2022-11-14 RX ORDER — ACETAMINOPHEN 500 MG
1000 TABLET ORAL ONCE
Status: CANCELLED | OUTPATIENT
Start: 2022-11-14 | End: 2022-11-14

## 2022-11-14 RX ORDER — OXYCODONE HCL 10 MG/1
10 TABLET, FILM COATED, EXTENDED RELEASE ORAL ONCE
Status: CANCELLED | OUTPATIENT
Start: 2022-11-14 | End: 2022-11-14

## 2022-12-12 ENCOUNTER — TELEPHONE (OUTPATIENT)
Dept: GASTROENTEROLOGY | Facility: CLINIC | Age: 72
End: 2022-12-12

## 2022-12-20 ENCOUNTER — OUTSIDE FACILITY SERVICE (OUTPATIENT)
Dept: GASTROENTEROLOGY | Facility: CLINIC | Age: 72
End: 2022-12-20

## 2022-12-20 PROCEDURE — G0500 MOD SEDAT ENDO SERVICE >5YRS: HCPCS | Performed by: INTERNAL MEDICINE

## 2022-12-20 PROCEDURE — G0105 COLORECTAL SCRN; HI RISK IND: HCPCS | Performed by: INTERNAL MEDICINE

## 2023-01-24 ENCOUNTER — TELEPHONE (OUTPATIENT)
Dept: ORTHOPEDIC SURGERY | Facility: CLINIC | Age: 73
End: 2023-01-24

## 2023-01-24 NOTE — TELEPHONE ENCOUNTER
Caller: HEATHER WESTBROOK    Relationship to patient: SELF    Best call back number: [phone]    Type of visit: LT KNEE SX    If rescheduling, when is the original appointment: 3-8-23       Additional notes:  PT WOULD LIKE A CALL BACK TO RESCHEDULE 3-8-23 SURGERY DATE.

## 2023-02-23 ENCOUNTER — PREP FOR SURGERY (OUTPATIENT)
Dept: OTHER | Facility: HOSPITAL | Age: 73
End: 2023-02-23
Payer: MEDICARE

## 2023-02-23 ENCOUNTER — OFFICE VISIT (OUTPATIENT)
Dept: ORTHOPEDIC SURGERY | Facility: CLINIC | Age: 73
End: 2023-02-23
Payer: MEDICARE

## 2023-02-23 VITALS
DIASTOLIC BLOOD PRESSURE: 74 MMHG | BODY MASS INDEX: 23.73 KG/M2 | HEIGHT: 69 IN | SYSTOLIC BLOOD PRESSURE: 120 MMHG | WEIGHT: 160.2 LBS

## 2023-02-23 DIAGNOSIS — M17.12 PRIMARY OSTEOARTHRITIS OF LEFT KNEE: Primary | ICD-10-CM

## 2023-02-23 PROCEDURE — 99214 OFFICE O/P EST MOD 30 MIN: CPT | Performed by: ORTHOPAEDIC SURGERY

## 2023-02-23 RX ORDER — TRIAMCINOLONE ACETONIDE 55 UG/1
SPRAY, METERED NASAL EVERY 12 HOURS SCHEDULED
COMMUNITY

## 2023-02-23 RX ORDER — RIZATRIPTAN BENZOATE 5 MG/1
TABLET, ORALLY DISINTEGRATING ORAL
COMMUNITY

## 2023-02-23 NOTE — PROGRESS NOTES
"    [facility] [place]        Subjective     CC: Pain of the Left Knee      HPI    Roque Bob is a 73 y.o. male.  Patient here today to discuss his left knee.  He continues to struggle with medial sided knee pain.  Has given up running.  Continues to play tennis.  Would like to continue these things indefinitely or as long as he feels physically able.  Has failed bracing and steroid and gel injections in the past.    Overall, patient's symptoms are as above.    ROS:    Constiutional:Pt denies fever, chills, nausea, or vomiting.  MSK:as above        Objective      Past Medical History  Past Medical History:   Diagnosis Date   • Arthritis    • Hypertension    • Knee swelling 1/01/2017    Left knee, mostly   • Prostate CA (HCC)    • Rotator cuff syndrome Right - 10/1/12 / Left - 1/1/22    Surgery on 2/19/13     Social History     Socioeconomic History   • Marital status:    Tobacco Use   • Smoking status: Never   • Smokeless tobacco: Never   Substance and Sexual Activity   • Alcohol use: No   • Drug use: No   • Sexual activity: Defer          Physical Exam  /74   Ht 175.3 cm (69.02\")   Wt 72.7 kg (160 lb 3.2 oz)   BMI 23.65 kg/m²     Body mass index is 23.65 kg/m².    Patient is well nourished and well developed.        Ortho Exam      Musculoskeletal:  Global Assessment:  Overall assessment of Lower Extremity Muscle Strength and Tone:  Left quadriceps--5/5   Left hamstrings--5/5       Left tibialis anterior--5/5  Left gastroc-soleus--5/5  Left EHL --5/5    Lower Extremity:    Inspection and Palpation:  Left knee:  Tenderness:  Over the medial joint line and moderate severity  Effusion:  1+  Crepitus:  Positive  Pulses:  2+  Ecchymosis:  None  Warmth:  None     ROM:  Left:  Extension: 5     Flexion:120    Instability:    Left:    Lachman Test:  Negative   Varus stress test negative  Valgus stress test negative    Deformities/Malalignments/Discrepancies:    Left:  Genu Varum "     Functional Testing:  Rosa's test:  Negative  Patella grind test:  Positive  Q-angle:  normal      Imaging/Labs/EMG Reviewed:  Imaging Results (Last 24 Hours)     Procedure Component Value Units Date/Time    XR Knee 4+ View Left [[id number]] Resulted: 02/23/23 1220     Updated: 02/23/23 1226    Narrative:      Knee X-Ray    Indication: Pain    Study:  Upright AP, Skiers, Lateral, and Sunrise views of Left knee(s)    Comparison: Left knee 2/23/2021    Findings:    Patient appears to have end-stage hypertrophic degenerative changes in the   medial compartment with bone-on-bone articulation noted on the skiers view   today.    There are mild to early moderate degenerative changes in the lateral   compartment.    There are Moderate to early severe changes in the patellofemoral   compartment.    Patient has overall varus alignment.    Kellgren-Marvin rdGrdrrdarddrderd:rd rd3rd Impression:   End-stage medial compartment and moderate early severe patellofemoral   compartment degnerative changes of the knee               Assessment    Assessment:  1. Primary osteoarthritis of left knee        Plan:  1. Recommend over the counter anti-inflammatories for pain and/or swelling  2. End-stage osteoarthritis left knee--we had a lengthy discussion with the patient today regarding treatment options and alternatives.  He would like to get something done this fall definitively.  I will give him the greenlight to continue playing tennis and jog after his arthroplasty provided he feels strong enough and well enough to do so.  We discussed the risk, benefits, potential hazards, typical recovery and rehab as well as reasonable alternatives to left total knee arthroplasty.  We will do this as an outpatient.  [facility] or results physio therapy near his home in [place].  We will use a full dose aspirin daily for DVT prophylaxis.  I will see him back preoperatively to finalize these plans and  answer any final questions.      Parish Head MD  02/23/23  16:38 EST      Dictated Utilizing Dragon Dictation.

## 2023-04-20 DIAGNOSIS — M17.12 PRIMARY OSTEOARTHRITIS OF LEFT KNEE: Primary | ICD-10-CM

## 2023-04-20 RX ORDER — CHLORHEXIDINE GLUCONATE 4 G/100ML
SOLUTION TOPICAL DAILY PRN
Qty: 118 ML | Refills: 0 | Status: SHIPPED | OUTPATIENT
Start: 2023-04-20

## 2023-07-25 ENCOUNTER — OFFICE VISIT (OUTPATIENT)
Dept: ORTHOPEDIC SURGERY | Facility: CLINIC | Age: 73
End: 2023-07-25
Payer: MEDICARE

## 2023-07-25 ENCOUNTER — PRE-ADMISSION TESTING (OUTPATIENT)
Dept: PREADMISSION TESTING | Facility: HOSPITAL | Age: 73
End: 2023-07-25
Payer: MEDICARE

## 2023-07-25 VITALS — WEIGHT: 156.31 LBS | HEIGHT: 69 IN | BODY MASS INDEX: 23.15 KG/M2

## 2023-07-25 VITALS
DIASTOLIC BLOOD PRESSURE: 68 MMHG | WEIGHT: 152.4 LBS | BODY MASS INDEX: 23.1 KG/M2 | HEIGHT: 68 IN | SYSTOLIC BLOOD PRESSURE: 114 MMHG

## 2023-07-25 DIAGNOSIS — M17.12 PRIMARY OSTEOARTHRITIS OF LEFT KNEE: Primary | ICD-10-CM

## 2023-07-25 LAB
ANION GAP SERPL CALCULATED.3IONS-SCNC: 9 MMOL/L (ref 5–15)
BASOPHILS # BLD AUTO: 0.03 10*3/MM3 (ref 0–0.2)
BASOPHILS NFR BLD AUTO: 0.6 % (ref 0–1.5)
BUN SERPL-MCNC: 31 MG/DL (ref 8–23)
BUN/CREAT SERPL: 26.1 (ref 7–25)
CALCIUM SPEC-SCNC: 9.6 MG/DL (ref 8.6–10.5)
CHLORIDE SERPL-SCNC: 104 MMOL/L (ref 98–107)
CO2 SERPL-SCNC: 27 MMOL/L (ref 22–29)
CREAT SERPL-MCNC: 1.19 MG/DL (ref 0.76–1.27)
CRP SERPL-MCNC: <0.3 MG/DL (ref 0–0.5)
DEPRECATED RDW RBC AUTO: 40.9 FL (ref 37–54)
EGFRCR SERPLBLD CKD-EPI 2021: 64.9 ML/MIN/1.73
EOSINOPHIL # BLD AUTO: 0.1 10*3/MM3 (ref 0–0.4)
EOSINOPHIL NFR BLD AUTO: 2 % (ref 0.3–6.2)
ERYTHROCYTE [DISTWIDTH] IN BLOOD BY AUTOMATED COUNT: 12.2 % (ref 12.3–15.4)
ERYTHROCYTE [SEDIMENTATION RATE] IN BLOOD: 8 MM/HR (ref 0–20)
GLUCOSE SERPL-MCNC: 100 MG/DL (ref 65–99)
HBA1C MFR BLD: 5.7 % (ref 4.8–5.6)
HCT VFR BLD AUTO: 37.8 % (ref 37.5–51)
HGB BLD-MCNC: 12.2 G/DL (ref 13–17.7)
IMM GRANULOCYTES # BLD AUTO: 0.01 10*3/MM3 (ref 0–0.05)
IMM GRANULOCYTES NFR BLD AUTO: 0.2 % (ref 0–0.5)
LYMPHOCYTES # BLD AUTO: 0.71 10*3/MM3 (ref 0.7–3.1)
LYMPHOCYTES NFR BLD AUTO: 14.5 % (ref 19.6–45.3)
MCH RBC QN AUTO: 29.5 PG (ref 26.6–33)
MCHC RBC AUTO-ENTMCNC: 32.3 G/DL (ref 31.5–35.7)
MCV RBC AUTO: 91.5 FL (ref 79–97)
MONOCYTES # BLD AUTO: 0.47 10*3/MM3 (ref 0.1–0.9)
MONOCYTES NFR BLD AUTO: 9.6 % (ref 5–12)
NEUTROPHILS NFR BLD AUTO: 3.59 10*3/MM3 (ref 1.7–7)
NEUTROPHILS NFR BLD AUTO: 73.1 % (ref 42.7–76)
NRBC BLD AUTO-RTO: 0 /100 WBC (ref 0–0.2)
PLATELET # BLD AUTO: 203 10*3/MM3 (ref 140–450)
PMV BLD AUTO: 9.9 FL (ref 6–12)
POTASSIUM SERPL-SCNC: 4.7 MMOL/L (ref 3.5–5.2)
QT INTERVAL: 422 MS
QTC INTERVAL: 376 MS
RBC # BLD AUTO: 4.13 10*6/MM3 (ref 4.14–5.8)
SODIUM SERPL-SCNC: 140 MMOL/L (ref 136–145)
WBC NRBC COR # BLD: 4.91 10*3/MM3 (ref 3.4–10.8)

## 2023-07-25 PROCEDURE — 83036 HEMOGLOBIN GLYCOSYLATED A1C: CPT

## 2023-07-25 PROCEDURE — 36415 COLL VENOUS BLD VENIPUNCTURE: CPT

## 2023-07-25 PROCEDURE — 85025 COMPLETE CBC W/AUTO DIFF WBC: CPT

## 2023-07-25 PROCEDURE — 93005 ELECTROCARDIOGRAM TRACING: CPT

## 2023-07-25 PROCEDURE — 86140 C-REACTIVE PROTEIN: CPT

## 2023-07-25 PROCEDURE — 80048 BASIC METABOLIC PNL TOTAL CA: CPT

## 2023-07-25 PROCEDURE — 85652 RBC SED RATE AUTOMATED: CPT

## 2023-07-25 NOTE — PAT
Telephone orders received form Dr Jackson ( spoke with Clair SEGOVIA)     Labs, anesthesia guidelines and ERAS, verified with readback

## 2023-07-25 NOTE — PAT
An arrival time for procedure was not provided during PAT visit. If patient had any questions or concerns about their arrival time, they were instructed to contact their surgeon/physician.  Additionally, if the patient referred to an arrival time that was acquired from their my chart account, patient was encouraged to verify that time with their surgeon/physician. Arrival times are NOT provided in Pre Admission Testing Department.    Prescription for Chlorhexidine shower called into patient's pharmacy or [facility] pharmacy by patient's surgeon.  Reinforced with patient to  the prescription from applicable pharmacy if they haven't already.  Verbal and written instructions given regarding proper use of Chlorhexidine body wash to patient and/or famlily during PAT visit. Patient/family also instructed to complete checklist and return it to Pre-op on the day of surgery.  Patient and/or family verbalized understanding.    Per Anesthesia Request, patient instructed not to take their ACE/ARB medications on the AM of surgery.    [employer] (by [employer]) pain pump patient informational handout given to patient.  Instructed patient to watch [employer] Patient Education Video regarding Peripheral Nerve Catheter that will be in place for upcoming surgery unless contraindicated. The video can be accessed using QR code noted on handout.  Patient agreed to watch video.  Stressed to patient to call [facility] 24/7 if patient has any questions or concerns after discharge.     Post-Surgery Information Instruction Sheet given to patient during Pre-Admission Testing Visit with verbal instructions to patient to return with PAT PASS on the day of surgery. Additionally, encouraged patient to review the information provided.    Patient denies any current skin issues.     Patient to apply Chlorhexadine wipes  to surgical area (as instructed) the night before procedure and the AM of procedure. Wipes provided.    Patient  instructed to drink 20 ounces of Gatorade and it needs to be completed 1 hour (for Main OR patients) or 2 hours (scheduled  section & [facility] patients) before given arrival time for procedure (NO RED Gatorade)    Patient verbalized understanding.    Patient viewed general PAT education video as instructed in their preoperative information received from their surgeon.  Patient stated the general PAT education video was viewed in its entirety and survey completed.  Copies of PAT general education handouts (Incentive Spirometry, Meds to Beds Program, Patient Belongings, Pre-op skin preparation instructions, Blood Glucose testing, Visitor policy, Surgery FAQ, Code H) distributed to patient if not printed. Education related to the PAT pass and skin preparation for surgery (if applicable) completed in PAT as a reinforcement to PAT education video. Patient instructed to return PAT pass provided today as well as completed skin preparation sheet (if applicable) on the day of procedure.     Additionally if patient had not viewed video yet but intended to view it at home or in our waiting area, then referred them to the handout with QR code/link provided during PAT visit.  Instructed patient to complete survey after viewing the video in its entirety.  Encouraged patient/family to read PAT general education handouts thoroughly and notify PAT staff with any questions or concerns. Patient verbalized understanding of all information and priority content.    Discussed with patient options for receiving total joint replacement education and assessed patient's ability and preference. Joint Replacement Guide given to patient during PAT visit since not received a copy within the last year. Encouraged patient/family to read guide thoroughly and notify PAT staff with any questions or concerns. Handout provided directing patient to links to watch online videos related to joint replacement surgery on the [facility] website.  The handout gives detailed instructions for joining an online joint replacement class through Zoom or phone conference offered on Thursdays. Patient agreed to participate by watching videos online. Patient verbalized understanding of instructions and to complete the online learning tool survey. Encouraged to share information with family and/or . An overview of the joint replacement education was provided during the visit including general perioperative instructions that are routine for all surgical patients (PAT PASS, wipes, directions to pre-op, etc.).    PATIENT EKG ON CHART AND IN EPIC FROM 07/25/2023

## 2023-07-25 NOTE — PROGRESS NOTES
"    [facility] [place] [facility] Surgery Clinic Note        Subjective     CC: Follow-up (5 month f/u; Primary osteoarthritis of left knee)      HPI    Roque Bob is a 73 y.o. male.  Patient returns the office today for his preop appointment for left TKA scheduled for 8/2/2023.  He is doing very well overall.  Has been healthy recently.  Saw Dr. Jauregui recently.    Overall, patient's symptoms are as above.    ROS:    Constiutional:Pt denies fever, chills, nausea, or vomiting.  MSK:as above        Objective      Past Medical History  Past Medical History:   Diagnosis Date    Arthritis     Hypertension     Knee swelling 1/01/2017    Left knee, mostly    Prostate CA     Rotator cuff syndrome Right - 10/1/12 / Left - 1/1/22    Surgery on 2/19/13     Social History     Socioeconomic History    Marital status:    Tobacco Use    Smoking status: Never    Smokeless tobacco: Never   Vaping Use    Vaping Use: Never used   Substance and Sexual Activity    Alcohol use: No    Drug use: No    Sexual activity: Defer          Physical Exam  /68   Ht 171.5 cm (67.5\")   Wt 69.1 kg (152 lb 6.4 oz)   BMI 23.52 kg/m²     Body mass index is 23.52 kg/m².    Patient is well nourished and well developed.        Ortho Exam      Musculoskeletal:  Global Assessment:  Overall assessment of Lower Extremity Muscle Strength and Tone:  Left quadriceps--5/5   Left hamstrings--5/5       Left tibialis anterior--5/5  Left gastroc-soleus--5/5  Left EHL --5/5    Lower Extremity:    Inspection and Palpation:  Left knee:  Tenderness:  Over the medial joint line and moderate severity  Effusion:  1+  Crepitus:  Positive  Pulses:  2+  Ecchymosis:  None  Warmth:  None     ROM:  Left:  Extension: 5     Flexion:120    Instability:    Left:    Lachman Test:  Negative   Varus stress test negative  Valgus stress test negative    Deformities/Malalignments/Discrepancies:    Left:  Genu Varum     Functional Testing:  Rosa's test:  Negative  Patella grind " test:  Positive  Q-angle:  normal      Imaging/Labs/EMG Reviewed:  Imaging Results (Last 24 Hours)       ** No results found for the last 24 hours. **              Assessment    Assessment:  1. Primary osteoarthritis of left knee        Plan:  Recommend over the counter anti-inflammatories for pain and/or swelling  Left knee arthritis--plan is for left TKA on 8/2/2023.  The risk, benefits, potential hazards, typical recovery and rehab as well as reasonable alternatives to the procedure were discussed with him.  Patient has excellent flexion we have told him that we will do our best to try to restore this as much as possible but there may be a hard stop from his medial congruent implant that limits him in flexion to some degree.  We will do the surgery as an outpatient.  He will use a full dose aspirin daily for 6 weeks for DVT prophylaxis beginning in the morning after surgery.  He will use the [facility] PT rehab at home program.  He has been to the people in [place] in the past.  PT prescription has been written for him today.  He will use a Game ready device.      Parish Head MD  07/25/23  16:24 EDT      Dictated Utilizing Dragon Dictation.

## 2023-07-25 NOTE — H&P (VIEW-ONLY)
"    [facility] [place] [facility] [place] [facility] [place]        Subjective     CC: Follow-up (5 month f/u; Primary osteoarthritis of left knee)      HPI    Roque Bob is a 73 y.o. male.  Patient returns the office today for his preop appointment for left TKA scheduled for 8/2/2023.  He is doing very well overall.  Has been healthy recently.  Saw Dr. Jauregui recently.    Overall, patient's symptoms are as above.    ROS:    Constiutional:Pt denies fever, chills, nausea, or vomiting.  MSK:as above        Objective      Past Medical History  Past Medical History:   Diagnosis Date    Arthritis     Hypertension     Knee swelling 1/01/2017    Left knee, mostly    Prostate CA     Rotator cuff syndrome Right - 10/1/12 / Left - 1/1/22    Surgery on 2/19/13     Social History     Socioeconomic History    Marital status:    Tobacco Use    Smoking status: Never    Smokeless tobacco: Never   Vaping Use    Vaping Use: Never used   Substance and Sexual Activity    Alcohol use: No    Drug use: No    Sexual activity: Defer          Physical Exam  /68   Ht 171.5 cm (67.5\")   Wt 69.1 kg (152 lb 6.4 oz)   BMI 23.52 kg/mý     Body mass index is 23.52 kg/mý.    Patient is well nourished and well developed.        Ortho Exam      Musculoskeletal:  Global Assessment:  Overall assessment of Lower Extremity Muscle Strength and Tone:  Left quadriceps--5/5   Left hamstrings--5/5       Left tibialis anterior--5/5  Left gastroc-soleus--5/5  Left EHL --5/5    Lower Extremity:    Inspection and Palpation:  Left knee:  Tenderness:  Over the medial joint line and moderate severity  Effusion:  1+  Crepitus:  Positive  Pulses:  2+  Ecchymosis:  None  Warmth:  None     ROM:  Left:  Extension: 5     Flexion:120    Instability:    Left:    Lachman Test:  Negative   Varus stress test negative  Valgus stress test negative    Deformities/Malalignments/Discrepancies:    Left:  Genu Varum     Functional Testing:  Rosa's test:  Negative  Patella grind " test:  Positive  Q-angle:  normal      Imaging/Labs/EMG Reviewed:  Imaging Results (Last 24 Hours)       ** No results found for the last 24 hours. **              Assessment    Assessment:  1. Primary osteoarthritis of left knee        Plan:  Recommend over the counter anti-inflammatories for pain and/or swelling  Left knee arthritis--plan is for left TKA on 8/2/2023.  The risk, benefits, potential hazards, typical recovery and rehab as well as reasonable alternatives to the procedure were discussed with him.  Patient has excellent flexion we have told him that we will do our best to try to restore this as much as possible but there may be a hard stop from his medial congruent implant that limits him in flexion to some degree.  We will do the surgery as an outpatient.  He will use a full dose aspirin daily for 6 weeks for DVT prophylaxis beginning in the morning after surgery.  He will use the [facility] PT rehab at home program.  He has been to the people in [place] in the past.  PT prescription has been written for him today.  He will use a Game ready device.      Parish Head MD  07/25/23  16:24 EDT      Dictated Utilizing Dragon Dictation.

## 2023-08-01 ENCOUNTER — ANESTHESIA EVENT (OUTPATIENT)
Dept: PERIOP | Facility: HOSPITAL | Age: 73
End: 2023-08-01
Payer: MEDICARE

## 2023-08-02 ENCOUNTER — ANESTHESIA EVENT CONVERTED (OUTPATIENT)
Dept: ANESTHESIOLOGY | Facility: HOSPITAL | Age: 73
End: 2023-08-02
Payer: MEDICARE

## 2023-08-02 ENCOUNTER — APPOINTMENT (OUTPATIENT)
Dept: GENERAL RADIOLOGY | Facility: HOSPITAL | Age: 73
End: 2023-08-02
Payer: MEDICARE

## 2023-08-02 ENCOUNTER — HOSPITAL ENCOUNTER (OUTPATIENT)
Facility: HOSPITAL | Age: 73
Discharge: HOME | End: 2023-08-03
Attending: ORTHOPAEDIC SURGERY | Admitting: ORTHOPAEDIC SURGERY
Payer: MEDICARE

## 2023-08-02 ENCOUNTER — ANESTHESIA (OUTPATIENT)
Dept: PERIOP | Facility: HOSPITAL | Age: 73
End: 2023-08-02
Payer: MEDICARE

## 2023-08-02 DIAGNOSIS — Z96.652 STATUS POST TOTAL LEFT KNEE REPLACEMENT: Primary | ICD-10-CM

## 2023-08-02 DIAGNOSIS — M17.12 PRIMARY OSTEOARTHRITIS OF LEFT KNEE: Primary | ICD-10-CM

## 2023-08-02 PROBLEM — R73.09 ELEVATED HEMOGLOBIN A1C: Status: ACTIVE | Noted: 2023-08-02

## 2023-08-02 PROBLEM — I10 HYPERTENSION: Status: ACTIVE | Noted: 2023-08-02

## 2023-08-02 LAB — GLUCOSE BLDC GLUCOMTR-MCNC: 93 MG/DL (ref 70–130)

## 2023-08-02 PROCEDURE — 25010000002 PROPOFOL 10 MG/ML EMULSION: Performed by: ANESTHESIOLOGY

## 2023-08-02 PROCEDURE — C1713 ANCHOR/SCREW BN/BN,TIS/BN: HCPCS | Performed by: ORTHOPAEDIC SURGERY

## 2023-08-02 PROCEDURE — 63710000001 LISINOPRIL 20 MG TABLET: Performed by: NURSE PRACTITIONER

## 2023-08-02 PROCEDURE — 25010000002 ROPIVACAINE PER 1 MG: Performed by: ORTHOPAEDIC SURGERY

## 2023-08-02 PROCEDURE — 73560 X-RAY EXAM OF KNEE 1 OR 2: CPT

## 2023-08-02 PROCEDURE — 25010000002 MORPHINE PER 10 MG: Performed by: ORTHOPAEDIC SURGERY

## 2023-08-02 PROCEDURE — P9612 CATHETERIZE FOR URINE SPEC: HCPCS

## 2023-08-02 PROCEDURE — 25010000002 CEFAZOLIN IN DEXTROSE 2-4 GM/100ML-% SOLUTION: Performed by: ORTHOPAEDIC SURGERY

## 2023-08-02 PROCEDURE — 25010000002 HYDROMORPHONE PER 4 MG: Performed by: NURSE ANESTHETIST, CERTIFIED REGISTERED

## 2023-08-02 PROCEDURE — 97161 PT EVAL LOW COMPLEX 20 MIN: CPT

## 2023-08-02 PROCEDURE — 25010000002 SUGAMMADEX 200 MG/2ML SOLUTION: Performed by: ANESTHESIOLOGY

## 2023-08-02 PROCEDURE — C1776 JOINT DEVICE (IMPLANTABLE): HCPCS | Performed by: ORTHOPAEDIC SURGERY

## 2023-08-02 PROCEDURE — 25010000002 HYDROMORPHONE 1 MG/ML SOLUTION

## 2023-08-02 PROCEDURE — 25010000002 ROPIVACAINE PER 1 MG: Performed by: NURSE ANESTHETIST, CERTIFIED REGISTERED

## 2023-08-02 PROCEDURE — G0378 HOSPITAL OBSERVATION PER HR: HCPCS

## 2023-08-02 PROCEDURE — 27447 TOTAL KNEE ARTHROPLASTY: CPT | Performed by: ORTHOPAEDIC SURGERY

## 2023-08-02 PROCEDURE — 25010000002 CEFAZOLIN IN DEXTROSE 2000 MG/ 100 ML SOLUTION: Performed by: ORTHOPAEDIC SURGERY

## 2023-08-02 PROCEDURE — 25010000002 ONDANSETRON PER 1 MG: Performed by: ANESTHESIOLOGY

## 2023-08-02 PROCEDURE — 27447 TOTAL KNEE ARTHROPLASTY: CPT | Performed by: PHYSICIAN ASSISTANT

## 2023-08-02 PROCEDURE — 25010000002 DEXAMETHASONE PER 1 MG: Performed by: ANESTHESIOLOGY

## 2023-08-02 PROCEDURE — 25010000002 FENTANYL CITRATE (PF) 50 MCG/ML SOLUTION

## 2023-08-02 PROCEDURE — A9270 NON-COVERED ITEM OR SERVICE: HCPCS | Performed by: NURSE PRACTITIONER

## 2023-08-02 PROCEDURE — 25010000002 BUPIVACAINE (PF) 0.25 % SOLUTION: Performed by: ANESTHESIOLOGY

## 2023-08-02 PROCEDURE — 82948 REAGENT STRIP/BLOOD GLUCOSE: CPT

## 2023-08-02 PROCEDURE — 97530 THERAPEUTIC ACTIVITIES: CPT

## 2023-08-02 PROCEDURE — 25010000002 KETOROLAC TROMETHAMINE PER 15 MG: Performed by: ORTHOPAEDIC SURGERY

## 2023-08-02 DEVICE — STEM TIB/KN PERSONA CMT 5D SZH LT: Type: IMPLANTABLE DEVICE | Site: KNEE | Status: FUNCTIONAL

## 2023-08-02 DEVICE — IMPLANTABLE DEVICE: Type: IMPLANTABLE DEVICE | Site: KNEE | Status: FUNCTIONAL

## 2023-08-02 DEVICE — COMP FEM/KN PERSONA CR CMT COCR STD SZ9 LT: Type: IMPLANTABLE DEVICE | Site: KNEE | Status: FUNCTIONAL

## 2023-08-02 DEVICE — ART/SRF KN PERSONA/VE PS GH 8TO11 11MM LT: Type: IMPLANTABLE DEVICE | Site: KNEE | Status: FUNCTIONAL

## 2023-08-02 DEVICE — CAP TOTL KN CMT PRIMARY: Type: IMPLANTABLE DEVICE | Site: KNEE | Status: FUNCTIONAL

## 2023-08-02 DEVICE — IMPLANTABLE DEVICE
Type: IMPLANTABLE DEVICE | Site: KNEE | Status: FUNCTIONAL
Brand: BIOMET® BONE CEMENT R

## 2023-08-02 DEVICE — DEV CONTRL TISS STRATAFIX SYMM PDS PLUS VIL CT-1 45CM: Type: IMPLANTABLE DEVICE | Site: KNEE | Status: FUNCTIONAL

## 2023-08-02 RX ORDER — ONDANSETRON 2 MG/ML
4 INJECTION INTRAMUSCULAR; INTRAVENOUS ONCE AS NEEDED
Status: DISCONTINUED | OUTPATIENT
Start: 2023-08-02 | End: 2023-08-02 | Stop reason: HOSPADM

## 2023-08-02 RX ORDER — SODIUM CHLORIDE 9 MG/ML
40 INJECTION, SOLUTION INTRAVENOUS AS NEEDED
Status: CANCELLED | OUTPATIENT
Start: 2023-08-02

## 2023-08-02 RX ORDER — ACETAMINOPHEN 500 MG
1000 TABLET ORAL EVERY 8 HOURS
Qty: 90 TABLET | Refills: 1 | Status: SHIPPED | OUTPATIENT
Start: 2023-08-02

## 2023-08-02 RX ORDER — CEFAZOLIN SODIUM 2 G/100ML
2000 INJECTION, SOLUTION INTRAVENOUS ONCE
Status: COMPLETED | OUTPATIENT
Start: 2023-08-02 | End: 2023-08-02

## 2023-08-02 RX ORDER — ROCURONIUM BROMIDE 10 MG/ML
INJECTION, SOLUTION INTRAVENOUS AS NEEDED
Status: DISCONTINUED | OUTPATIENT
Start: 2023-08-02 | End: 2023-08-02 | Stop reason: SURG

## 2023-08-02 RX ORDER — OXYCODONE HCL 10 MG/1
10 TABLET, FILM COATED, EXTENDED RELEASE ORAL ONCE
Status: COMPLETED | OUTPATIENT
Start: 2023-08-02 | End: 2023-08-02

## 2023-08-02 RX ORDER — BUPIVACAINE HYDROCHLORIDE 2.5 MG/ML
INJECTION, SOLUTION EPIDURAL; INFILTRATION; INTRACAUDAL
Status: COMPLETED | OUTPATIENT
Start: 2023-08-02 | End: 2023-08-02

## 2023-08-02 RX ORDER — IPRATROPIUM BROMIDE AND ALBUTEROL SULFATE 2.5; .5 MG/3ML; MG/3ML
3 SOLUTION RESPIRATORY (INHALATION) ONCE AS NEEDED
Status: DISCONTINUED | OUTPATIENT
Start: 2023-08-02 | End: 2023-08-02 | Stop reason: HOSPADM

## 2023-08-02 RX ORDER — FAMOTIDINE 20 MG/1
20 TABLET, FILM COATED ORAL
Status: COMPLETED | OUTPATIENT
Start: 2023-08-02 | End: 2023-08-02

## 2023-08-02 RX ORDER — GLYCOPYRROLATE 0.2 MG/ML
INJECTION INTRAMUSCULAR; INTRAVENOUS AS NEEDED
Status: DISCONTINUED | OUTPATIENT
Start: 2023-08-02 | End: 2023-08-02 | Stop reason: SURG

## 2023-08-02 RX ORDER — ONDANSETRON 2 MG/ML
INJECTION INTRAMUSCULAR; INTRAVENOUS AS NEEDED
Status: DISCONTINUED | OUTPATIENT
Start: 2023-08-02 | End: 2023-08-02 | Stop reason: SURG

## 2023-08-02 RX ORDER — ONDANSETRON 4 MG/1
4 TABLET, FILM COATED ORAL ONCE AS NEEDED
Status: DISCONTINUED | OUTPATIENT
Start: 2023-08-02 | End: 2023-08-03 | Stop reason: HOSPADM

## 2023-08-02 RX ORDER — LIDOCAINE HYDROCHLORIDE 10 MG/ML
0.5 INJECTION, SOLUTION EPIDURAL; INFILTRATION; INTRACAUDAL; PERINEURAL ONCE AS NEEDED
Status: COMPLETED | OUTPATIENT
Start: 2023-08-02 | End: 2023-08-02

## 2023-08-02 RX ORDER — LIDOCAINE HYDROCHLORIDE 10 MG/ML
INJECTION, SOLUTION EPIDURAL; INFILTRATION; INTRACAUDAL; PERINEURAL AS NEEDED
Status: DISCONTINUED | OUTPATIENT
Start: 2023-08-02 | End: 2023-08-02 | Stop reason: SURG

## 2023-08-02 RX ORDER — IBUPROFEN 600 MG/1
600 TABLET ORAL EVERY 6 HOURS PRN
Qty: 90 TABLET | Refills: 0 | Status: SHIPPED | OUTPATIENT
Start: 2023-08-02

## 2023-08-02 RX ORDER — CEFAZOLIN SODIUM 2 G/100ML
2 INJECTION, SOLUTION INTRAVENOUS EVERY 8 HOURS
Status: COMPLETED | OUTPATIENT
Start: 2023-08-02 | End: 2023-08-03

## 2023-08-02 RX ORDER — DEXAMETHASONE SODIUM PHOSPHATE 10 MG/ML
INJECTION INTRAMUSCULAR; INTRAVENOUS AS NEEDED
Status: DISCONTINUED | OUTPATIENT
Start: 2023-08-02 | End: 2023-08-02 | Stop reason: SURG

## 2023-08-02 RX ORDER — MIDAZOLAM HYDROCHLORIDE 1 MG/ML
0.5 INJECTION INTRAMUSCULAR; INTRAVENOUS
Status: DISCONTINUED | OUTPATIENT
Start: 2023-08-02 | End: 2023-08-02 | Stop reason: HOSPADM

## 2023-08-02 RX ORDER — TRANEXAMIC ACID 10 MG/ML
1000 INJECTION, SOLUTION INTRAVENOUS ONCE
Status: COMPLETED | OUTPATIENT
Start: 2023-08-02 | End: 2023-08-02

## 2023-08-02 RX ORDER — IBUPROFEN 600 MG/1
600 TABLET ORAL EVERY 6 HOURS PRN
Status: DISCONTINUED | OUTPATIENT
Start: 2023-08-02 | End: 2023-08-03 | Stop reason: HOSPADM

## 2023-08-02 RX ORDER — FENTANYL CITRATE 50 UG/ML
INJECTION, SOLUTION INTRAMUSCULAR; INTRAVENOUS
Status: COMPLETED
Start: 2023-08-02 | End: 2023-08-02

## 2023-08-02 RX ORDER — HYDROMORPHONE HYDROCHLORIDE 1 MG/ML
0.5 INJECTION, SOLUTION INTRAMUSCULAR; INTRAVENOUS; SUBCUTANEOUS
Status: COMPLETED | OUTPATIENT
Start: 2023-08-02 | End: 2023-08-02

## 2023-08-02 RX ORDER — ASPIRIN 325 MG
325 TABLET, DELAYED RELEASE (ENTERIC COATED) ORAL DAILY
Status: DISCONTINUED | OUTPATIENT
Start: 2023-08-03 | End: 2023-08-03 | Stop reason: HOSPADM

## 2023-08-02 RX ORDER — ONDANSETRON 4 MG/1
4 TABLET, FILM COATED ORAL EVERY 8 HOURS PRN
Qty: 15 TABLET | Refills: 1 | Status: SHIPPED | OUTPATIENT
Start: 2023-08-02

## 2023-08-02 RX ORDER — TRANEXAMIC ACID 10 MG/ML
1000 INJECTION, SOLUTION INTRAVENOUS ONCE
Status: DISCONTINUED | OUTPATIENT
Start: 2023-08-02 | End: 2023-08-02 | Stop reason: HOSPADM

## 2023-08-02 RX ORDER — OXYCODONE HYDROCHLORIDE 5 MG/1
5 TABLET ORAL EVERY 6 HOURS PRN
Qty: 30 TABLET | Refills: 0 | Status: SHIPPED | OUTPATIENT
Start: 2023-08-02

## 2023-08-02 RX ORDER — MAGNESIUM HYDROXIDE 1200 MG/15ML
LIQUID ORAL AS NEEDED
Status: DISCONTINUED | OUTPATIENT
Start: 2023-08-02 | End: 2023-08-02 | Stop reason: HOSPADM

## 2023-08-02 RX ORDER — ROPIVACAINE HYDROCHLORIDE 2 MG/ML
INJECTION, SOLUTION EPIDURAL; INFILTRATION; PERINEURAL CONTINUOUS
Status: DISCONTINUED | OUTPATIENT
Start: 2023-08-02 | End: 2023-08-03 | Stop reason: HOSPADM

## 2023-08-02 RX ORDER — FENTANYL CITRATE 50 UG/ML
50 INJECTION, SOLUTION INTRAMUSCULAR; INTRAVENOUS
Status: DISCONTINUED | OUTPATIENT
Start: 2023-08-02 | End: 2023-08-02 | Stop reason: HOSPADM

## 2023-08-02 RX ORDER — LISINOPRIL 20 MG/1
20 TABLET ORAL DAILY
Status: DISCONTINUED | OUTPATIENT
Start: 2023-08-02 | End: 2023-08-03 | Stop reason: HOSPADM

## 2023-08-02 RX ORDER — SODIUM CHLORIDE 0.9 % (FLUSH) 0.9 %
10 SYRINGE (ML) INJECTION EVERY 12 HOURS SCHEDULED
Status: CANCELLED | OUTPATIENT
Start: 2023-08-02

## 2023-08-02 RX ORDER — ROPIVACAINE HYDROCHLORIDE 2 MG/ML
1 INJECTION, SOLUTION EPIDURAL; INFILTRATION; PERINEURAL CONTINUOUS
Start: 2023-08-02

## 2023-08-02 RX ORDER — DOCUSATE SODIUM 100 MG/1
100 CAPSULE, LIQUID FILLED ORAL 2 TIMES DAILY PRN
Qty: 60 CAPSULE | Refills: 0 | Status: SHIPPED | OUTPATIENT
Start: 2023-08-02

## 2023-08-02 RX ORDER — OXYCODONE HYDROCHLORIDE AND ACETAMINOPHEN 5; 325 MG/1; MG/1
1 TABLET ORAL ONCE AS NEEDED
Status: DISCONTINUED | OUTPATIENT
Start: 2023-08-02 | End: 2023-08-03 | Stop reason: HOSPADM

## 2023-08-02 RX ORDER — PROPOFOL 10 MG/ML
VIAL (ML) INTRAVENOUS AS NEEDED
Status: DISCONTINUED | OUTPATIENT
Start: 2023-08-02 | End: 2023-08-02 | Stop reason: SURG

## 2023-08-02 RX ORDER — HYDROMORPHONE HYDROCHLORIDE 1 MG/ML
0.5 INJECTION, SOLUTION INTRAMUSCULAR; INTRAVENOUS; SUBCUTANEOUS
Status: DISCONTINUED | OUTPATIENT
Start: 2023-08-02 | End: 2023-08-02 | Stop reason: HOSPADM

## 2023-08-02 RX ORDER — ACETAMINOPHEN 500 MG
1000 TABLET ORAL ONCE
Status: COMPLETED | OUTPATIENT
Start: 2023-08-02 | End: 2023-08-02

## 2023-08-02 RX ORDER — ASPIRIN 325 MG
325 TABLET, DELAYED RELEASE (ENTERIC COATED) ORAL DAILY
Qty: 42 TABLET | Refills: 0 | Status: SHIPPED | OUTPATIENT
Start: 2023-08-02

## 2023-08-02 RX ORDER — SODIUM CHLORIDE, SODIUM LACTATE, POTASSIUM CHLORIDE, CALCIUM CHLORIDE 600; 310; 30; 20 MG/100ML; MG/100ML; MG/100ML; MG/100ML
9 INJECTION, SOLUTION INTRAVENOUS CONTINUOUS PRN
Status: DISCONTINUED | OUTPATIENT
Start: 2023-08-02 | End: 2023-08-02 | Stop reason: HOSPADM

## 2023-08-02 RX ORDER — SODIUM CHLORIDE 0.9 % (FLUSH) 0.9 %
10 SYRINGE (ML) INJECTION AS NEEDED
Status: CANCELLED | OUTPATIENT
Start: 2023-08-02

## 2023-08-02 RX ADMIN — SODIUM CHLORIDE, POTASSIUM CHLORIDE, SODIUM LACTATE AND CALCIUM CHLORIDE: 600; 310; 30; 20 INJECTION, SOLUTION INTRAVENOUS at 08:28

## 2023-08-02 RX ADMIN — HYDROMORPHONE HYDROCHLORIDE 0.5 MG: 1 INJECTION, SOLUTION INTRAMUSCULAR; INTRAVENOUS; SUBCUTANEOUS at 11:16

## 2023-08-02 RX ADMIN — CEFAZOLIN SODIUM 2 G: 2 INJECTION, SOLUTION INTRAVENOUS at 16:16

## 2023-08-02 RX ADMIN — FENTANYL CITRATE 50 MCG: 50 INJECTION, SOLUTION INTRAMUSCULAR; INTRAVENOUS at 10:55

## 2023-08-02 RX ADMIN — LIDOCAINE HYDROCHLORIDE 0.5 ML: 10 INJECTION, SOLUTION EPIDURAL; INFILTRATION; INTRACAUDAL; PERINEURAL at 06:59

## 2023-08-02 RX ADMIN — PROPOFOL 40 MG: 10 INJECTION, EMULSION INTRAVENOUS at 07:36

## 2023-08-02 RX ADMIN — CEFAZOLIN SODIUM 2000 MG: 2 INJECTION, SOLUTION INTRAVENOUS at 07:48

## 2023-08-02 RX ADMIN — ACETAMINOPHEN 1000 MG: 500 TABLET ORAL at 06:59

## 2023-08-02 RX ADMIN — HYDROMORPHONE HYDROCHLORIDE 0.5 MG: 1 INJECTION, SOLUTION INTRAMUSCULAR; INTRAVENOUS; SUBCUTANEOUS at 10:34

## 2023-08-02 RX ADMIN — GLYCOPYRROLATE 0.2 MG: 0.4 INJECTION INTRAMUSCULAR; INTRAVENOUS at 08:26

## 2023-08-02 RX ADMIN — TRANEXAMIC ACID 1000 MG: 10 INJECTION, SOLUTION INTRAVENOUS at 09:32

## 2023-08-02 RX ADMIN — LISINOPRIL 20 MG: 20 TABLET ORAL at 14:33

## 2023-08-02 RX ADMIN — ROCURONIUM BROMIDE 20 MG: 10 SOLUTION INTRAVENOUS at 08:20

## 2023-08-02 RX ADMIN — FAMOTIDINE 20 MG: 20 TABLET ORAL at 06:59

## 2023-08-02 RX ADMIN — TRANEXAMIC ACID 1000 MG: 10 INJECTION, SOLUTION INTRAVENOUS at 07:55

## 2023-08-02 RX ADMIN — LIDOCAINE HYDROCHLORIDE 50 MG: 10 INJECTION, SOLUTION EPIDURAL; INFILTRATION; INTRACAUDAL; PERINEURAL at 07:42

## 2023-08-02 RX ADMIN — SODIUM CHLORIDE, POTASSIUM CHLORIDE, SODIUM LACTATE AND CALCIUM CHLORIDE: 600; 310; 30; 20 INJECTION, SOLUTION INTRAVENOUS at 07:29

## 2023-08-02 RX ADMIN — ROCURONIUM BROMIDE 50 MG: 10 SOLUTION INTRAVENOUS at 07:43

## 2023-08-02 RX ADMIN — OXYCODONE HYDROCHLORIDE 10 MG: 10 TABLET, FILM COATED, EXTENDED RELEASE ORAL at 06:59

## 2023-08-02 RX ADMIN — SODIUM CHLORIDE, POTASSIUM CHLORIDE, SODIUM LACTATE AND CALCIUM CHLORIDE 9 ML/HR: 600; 310; 30; 20 INJECTION, SOLUTION INTRAVENOUS at 07:00

## 2023-08-02 RX ADMIN — SUGAMMADEX 200 MG: 100 INJECTION, SOLUTION INTRAVENOUS at 10:15

## 2023-08-02 RX ADMIN — PROPOFOL 100 MG: 10 INJECTION, EMULSION INTRAVENOUS at 07:42

## 2023-08-02 RX ADMIN — Medication 1000 MG: at 10:35

## 2023-08-02 RX ADMIN — ONDANSETRON 4 MG: 2 INJECTION INTRAMUSCULAR; INTRAVENOUS at 10:05

## 2023-08-02 RX ADMIN — BUPIVACAINE HYDROCHLORIDE 30 ML: 2.5 INJECTION, SOLUTION EPIDURAL; INFILTRATION; INTRACAUDAL; PERINEURAL at 07:46

## 2023-08-02 RX ADMIN — DEXAMETHASONE SODIUM PHOSPHATE 8 MG: 10 INJECTION INTRAMUSCULAR; INTRAVENOUS at 07:55

## 2023-08-02 RX ADMIN — CEFAZOLIN SODIUM 2 G: 2 INJECTION, SOLUTION INTRAVENOUS at 23:37

## 2023-08-02 NOTE — H&P
Patient Name: Roque Bob  MRN: [id number]  : 1950  DOS: 2023    Attending: Parish Head,*    Primary Care Provider: Fabian Jauregui MD      Chief complaint: Left knee pain    Subjective   Patient is a pleasant 73 y.o. male presented for scheduled surgery by Dr. Head.  He underwent left total knee arthroplasty under spinal anesthesia.  He tolerated surgery well and was admitted for further medical management.  His knee has been painful for about 4 years.  He denies use of assistive device for ambulation or recent falls.    When seen postop he is doing well.  His pain is well controlled.  He denies nausea, shortness of breath or chest pain.  No history of DVT or PE.    Allergies:  No Known Allergies    Meds:  Medications Prior to Admission   Medication Sig Dispense Refill Last Dose    acetaminophen (TYLENOL) 325 MG tablet Take 2 tablets by mouth Every 6 (Six) Hours As Needed for Mild Pain.       lisinopril (PRINIVIL,ZESTRIL) 20 MG tablet Take 1 tablet by mouth Daily.       meloxicam (MOBIC) 7.5 MG tablet Take 1 tablet by mouth Daily. Patient to hold three days prior to surgery scheduled on 2023 per Dr Jackson   2023    Potassium Citrate ER 15 MEQ (1620 MG) tablet controlled-release Take 1 tablet by mouth 2 (Two) Times a Day.  11     rizatriptan MLT (MAXALT-MLT) 5 MG disintegrating tablet rizatriptan 5 mg disintegrating tablet   Take 1 tablet every day by oral route as needed.            History:   Past Medical History:   Diagnosis Date    Arthritis     Hypertension     Prostate CA     Rotator cuff syndrome Right - 10/1/12 / Left - 22    Surgery on 14     Past Surgical History:   Procedure Laterality Date    COLONOSCOPY      EXTRACORPOREAL SHOCK WAVE LITHOTRIPSY (ESWL) Bilateral 2021    Procedure: EXTRACORPOREAL SHOCKWAVE LITHOTRIPSY BILATERAL;  Surgeon: Cristiano Foss Jr., MD;  Location: [facility];  Service: Urology;  Laterality: Bilateral;     "HAND SURGERY Right 2021    PROSTATE SURGERY      SHOULDER SURGERY Right 2013    RCR    SHOULDER SURGERY Left 05/27/2022    R-Parish Head MD, [facility], [place], operated by [facility], [place], [place]     Family History   Problem Relation Age of Onset    Stroke Mother     Stroke Father      Social History     Tobacco Use    Smoking status: Never    Smokeless tobacco: Never   Vaping Use    Vaping Use: Never used   Substance Use Topics    Alcohol use: No    Drug use: No   He is  with 2 children.  He is retired .    Review of Systems  All systems were reviewed and negative except for:  Neurological: positive for  dizziness    Vital Signs  /52   Pulse 54   Temp 97.6 øF (36.4 øC) (Oral)   Resp 16   Ht 175.3 cm (69\")   Wt 70.8 kg (156 lb)   SpO2 93%   BMI 23.04 kg/mý     Physical Exam:    General Appearance:    Alert, cooperative, in no acute distress   Head:    Normocephalic, without obvious abnormality, atraumatic   Eyes:            Lids and lashes normal, conjunctivae and sclerae normal, no   icterus, no pallor, corneas clear,    Ears:    Ears appear intact with no abnormalities noted   Throat:   No oral lesions, no thrush, oral mucosa moist   Neck:   No adenopathy, supple, trachea midline, no thyromegaly    Lungs:     Clear to auscultation,respirations regular, even and unlabored    Heart:    Regular rhythm and normal rate, normal S1 and S2, no murmur, no gallop   Abdomen:     Normal bowel sounds, no masses, no organomegaly, soft non-tender, non-distended, no guarding, no rebound  tenderness   Genitalia:    Deferred   Extremities: Left knee Ace wrap CDI.  Nerve block present.   Pulses:   Pulses palpable and equal bilaterally   Skin:   No bleeding, bruising or rash   Neurologic:   Cranial nerves 2 - 12 grossly intact. Flexion and dorsiflexion intact bilateral feet.        I reviewed the patient's new clinical results.       Lab Results   Component Value Date    HGBA1C 5.70 (H) 07/25/2023      Latest " Reference Range & Units 07/25/23 11:27   Sodium 136 - 145 mmol/L 140   Potassium 3.5 - 5.2 mmol/L 4.7   Chloride 98 - 107 mmol/L 104   CO2 22.0 - 29.0 mmol/L 27.0   Anion Gap 5.0 - 15.0 mmol/L 9.0   BUN 8 - 23 mg/dL 31 (H)   Creatinine 0.76 - 1.27 mg/dL 1.19   BUN/Creatinine Ratio 7.0 - 25.0  26.1 (H)   eGFR >60.0 mL/min/1.73 64.9   Glucose 65 - 99 mg/dL 100 (H)   Calcium 8.6 - 10.5 mg/dL 9.6   Hemoglobin A1C 4.80 - 5.60 % 5.70 (H)   C-Reactive Protein 0.00 - 0.50 mg/dL <0.30   WBC 3.40 - 10.80 10*3/mm3 4.91   RBC 4.14 - 5.80 10*6/mm3 4.13 (L)   Hemoglobin 13.0 - 17.7 g/dL 12.2 (L)   Hematocrit 37.5 - 51.0 % 37.8   Platelets 140 - 450 10*3/mm3 203   RDW 12.3 - 15.4 % 12.2 (L)   MCV 79.0 - 97.0 fL 91.5   MCH 26.6 - 33.0 pg 29.5   MCHC 31.5 - 35.7 g/dL 32.3   MPV 6.0 - 12.0 fL 9.9   (H): Data is abnormally high  (L): Data is abnormally low    Assessment and Plan:     Status post total left knee replacement    Primary osteoarthritis of left knee    Hypertension    Elevated hemoglobin A1c      Plan  1. PT/OT- WBAT LLE  2. Pain control-prns, AC nerve block   3. IS-encourage  4. DVT proph- Mechs/ASA  5. Bowel regimen  6. Resume home medications as appropriate  7. Monitor post-op labs  8. DC planning for home    HTN  - Continue home lisinopril  - Monitor BP   - Holding parameters for BP meds  - Labetalol PRN for SBP>170    Elevated hemoglobin A1c: In prediabetic range  - Explained to patient implication of A1C elevation, need to modify diet and increase activity, and f/u with PCP.      VIRGEN Carnes  08/02/23  14:52 EDT

## 2023-08-02 NOTE — PLAN OF CARE
Goal Outcome Evaluation:  Plan of Care Reviewed With: patient        Progress: no change  Outcome Evaluation: Pt amb 60'+100' with FWW aned CGAx2. Pt reproted dizziness throughout session with slow gait pattern and decreased stride length noted. No knee buckling observed. Activity limited by symptoms reported. HEP and precautions reviewed with pt. Recommend d/c home with assist and OPPT tomorrow if symptoms improve and pt is safe for stair training.      Anticipated Discharge Disposition (PT): home with assist, home with outpatient therapy services

## 2023-08-02 NOTE — THERAPY EVALUATION
Patient Name: Roque Bob  : 1950    MRN: [id number]                              Today's Date: 2023       Admit Date: 2023    Visit Dx:     ICD-10-CM ICD-9-CM   1. Primary osteoarthritis of left knee  M17.12 715.16     Patient Active Problem List   Diagnosis    Primary osteoarthritis of left knee    Status post total left knee replacement    Hypertension    Elevated hemoglobin A1c     Past Medical History:   Diagnosis Date    Arthritis     Hypertension     Prostate CA     Rotator cuff syndrome Right - 10/1/12 / Left - 22    Surgery on 13     Past Surgical History:   Procedure Laterality Date    COLONOSCOPY      EXTRACORPOREAL SHOCK WAVE LITHOTRIPSY (ESWL) Bilateral 2021    Procedure: EXTRACORPOREAL SHOCKWAVE LITHOTRIPSY BILATERAL;  Surgeon: Cristiano Foss Jr., MD;  Location: [facility];  Service: Urology;  Laterality: Bilateral;    HAND SURGERY Right     PROSTATE SURGERY      SHOULDER SURGERY Right     RCR    SHOULDER SURGERY Left 2022    RCR-Parish Head MD, [facility], [place], [place]      General Information       Row Name 23 1441          Physical Therapy Time and Intention    Document Type evaluation  -     Mode of Treatment physical therapy  -       Row Name 23 1441          General Information    Patient Profile Reviewed yes  -HP     Prior Level of Function min assist:;community mobility;all household mobility;gait;transfer;ADL's;bed mobility  -     Existing Precautions/Restrictions fall;other (see comments)  adductor canal nerve cath  -     Barriers to Rehab hearing deficit  -       Row Name 23 1441          Living Environment    People in Home spouse;other (see comments)  spouse is visually impaired; dtr present and planning to stay with pt at d/c  -HP       Row Name 23 1441          Home Main Entrance    Number of Stairs, Main Entrance two  -HP     Stair Railings, Main Entrance none  -HP       Row Name  08/02/23 1441          Stairs Within Home, Primary    Number of Stairs, Within Home, Primary none  -       Row Name 08/02/23 1441          Cognition    Orientation Status (Cognition) oriented x 3  -       Row Name 08/02/23 1441          Safety Issues, Functional Mobility    Safety Issues Affecting Function (Mobility) insight into deficits/self-awareness;awareness of need for assistance;safety precaution awareness  -     Impairments Affecting Function (Mobility) balance;endurance/activity tolerance;pain;strength  -               User Key  (r) = Recorded By, (t) = Taken By, (c) = Cosigned By      Initials Name Provider Type     Suzanna Alcantara, PT Physical Therapist                   Mobility       Row Name 08/02/23 1451          Bed Mobility    Bed Mobility supine-sit  -     Supine-Sit [place] (Bed Mobility) standby assist  -     Comment, (Bed Mobility) VC for sequencing and line management  -       Row Name 08/02/23 1451          Transfers    Comment, (Transfers) VC to push from bed when standing and reach for chair when sitting  -       Row Name 08/02/23 1451          Sit-Stand Transfer    Sit-Stand [place] (Transfers) contact guard;2 person assist  -     Assistive Device (Sit-Stand Transfers) walker, front-wheeled  -       Row Name 08/02/23 1451 08/02/23 1400       Gait/Stairs (Locomotion)    [place] Level (Gait) contact guard;2 person assist  - --    Assistive Device (Gait) walker, front-wheeled  - --    Ambulated day of surgery or within 4 hours of PACU discharge yes  -HP yes  -HP    Distance in Feet (Gait) 60+100  - 60  -HP    Deviations/Abnormal Patterns (Gait) bilateral deviations;base of support, wide;weight shifting decreased;stride length decreased;gait speed decreased  - --    Bilateral Gait Deviations forward flexed posture;heel strike decreased  - --    Comment, (Gait/Stairs) pt amb with step-through gait pattern, decreased stride length B, forward flexed  posture, slow gait speed, and wide JAMIE. Pt reported dizziness, limiting functional mobility. BP stable at time of evaluation. Pt reported dizziness at baseline. No knee buckling or LOB noted.  - --      [facility] Name 08/02/23 1451          Mobility    Extremity Weight-bearing Status left lower extremity  -     Left Lower Extremity (Weight-bearing Status) weight-bearing as tolerated (WBAT)  -               User Key  (r) = Recorded By, (t) = Taken By, (c) = Cosigned By      Initials Name Provider Type     Suzanna Alcantara PT Physical Therapist                   Obj/Interventions       Row Name 08/02/23 1453          Range of Motion Comprehensive    General Range of Motion lower extremity range of motion deficits identified  -     Comment, General Range of Motion L knee ROM 15-65  -HP       Row Name 08/02/23 1453          Strength Comprehensive (MMT)    General Manual Muscle Testing (MMT) Assessment lower extremity strength deficits identified  -     Comment, General Manual Muscle Testing (MMT) Assessment Pt able to perform B active ankle DF and SLR  -HP       Row Name 08/02/23 1453          Motor Skills    Therapeutic Exercise knee;ankle  -HP       Row Name 08/02/23 1453          Knee (Therapeutic Exercise)    Knee (Therapeutic Exercise) isometric exercises;strengthening exercise  -     Knee Isometrics (Therapeutic Exercise) left;quad sets;10 repetitions  -     Knee Strengthening (Therapeutic Exercise) left;SLR (straight leg raise);SAQ (short arc quad);LAQ (long arc quad);heel slides;3 repetitions  -HP       Row Name 08/02/23 1453          Ankle (Therapeutic Exercise)    Ankle (Therapeutic Exercise) AROM (active range of motion)  -     Ankle AROM (Therapeutic Exercise) bilateral;dorsiflexion;plantarflexion;10 repetitions  -HP       Row Name 08/02/23 1453          Balance    Balance Assessment sitting static balance;sitting dynamic balance;sit to stand dynamic balance;standing static balance;standing  dynamic balance  -HP     Static Sitting Balance standby assist  -HP     Dynamic Sitting Balance standby assist  -HP     Position, Sitting Balance sitting edge of bed  -HP     Static Standing Balance contact guard  -HP     Dynamic Standing Balance contact guard  -HP     Position/Device Used, Standing Balance supported;walker, rolling  -HP     Balance Interventions sitting;standing;sit to stand;occupation based/functional task  -       Row Name 08/02/23 1458          Sensory Assessment (Somatosensory)    Sensory Assessment (Somatosensory) LE sensation intact  -               User Key  (r) = Recorded By, (t) = Taken By, (c) = Cosigned By      Initials Name Provider Type     Suzanna Alcantara, PT Physical Therapist                   Goals/Plan       Row Name 08/02/23 1610          Bed Mobility Goal 1 (PT)    Activity/Assistive Device (Bed Mobility Goal 1, PT) sit to supine/supine to sit  -HP     [place] Level/Cues Needed (Bed Mobility Goal 1, PT) modified independence  -HP     Time Frame (Bed Mobility Goal 1, PT) long term goal (LTG);3 days  -HP     Progress/Outcomes (Bed Mobility Goal 1, PT) goal ongoing  -       Row Name 08/02/23 1610          Transfer Goal 1 (PT)    Activity/Assistive Device (Transfer Goal 1, PT) sit-to-stand/stand-to-sit  -HP     [place] Level/Cues Needed (Transfer Goal 1, PT) modified independence  -HP     Time Frame (Transfer Goal 1, PT) long term goal (LTG);3 days  -HP     Progress/Outcome (Transfer Goal 1, PT) goal ongoing  -       Row Name 08/02/23 1610          Gait Training Goal 1 (PT)    Activity/Assistive Device (Gait Training Goal 1, PT) gait (walking locomotion)  -HP     [place] Level (Gait Training Goal 1, PT) modified independence  -HP     Distance (Gait Training Goal 1, PT) 500  -HP     Time Frame (Gait Training Goal 1, PT) long term goal (LTG);3 days  -HP     Progress/Outcome (Gait Training Goal 1, PT) goal ongoing  \[facility] [place] [facility]\""       Row Name 08/02/23 1610          ROM  Goal 1 (PT)    ROM Goal 1 (PT) L knee ROM 0-90  -HP     Time Frame (ROM Goal 1, PT) long-term goal (LTG);3 days  -HP     Progress/Outcome (ROM Goal 1, PT) goal ongoing  -       Row Name 08/02/23 1610          Stairs Goal 1 (PT)    Activity/Assistive Device (Stairs Goal 1, PT) ascending stairs;descending stairs  -HP     [place] Level/Cues Needed (Stairs Goal 1, PT) contact guard required  -HP     Number of Stairs (Stairs Goal 1, PT) 2  -HP     Time Frame (Stairs Goal 1, PT) long term goal (LTG);3 days  -HP     Progress/Outcome (Stairs Goal 1, PT) goal ongoing  -       Row Name 08/02/23 1610          Therapy Assessment/Plan (PT)    Planned Therapy Interventions (PT) balance training;bed mobility training;gait training;home exercise program;patient/family education;transfer training;stretching;strengthening;stair training;ROM (range of motion)  -               User Key  (r) = Recorded By, (t) = Taken By, (c) = Cosigned By      Initials Name Provider Type     Suzanna Alcantara, PT Physical Therapist                   Clinical Impression       Row Name 08/02/23 1607          Pain    Pretreatment Pain Rating 3/10  -HP     Posttreatment Pain Rating 3/10  -     Pain Location - Side/Orientation Left  -     Pain Location generalized  -     Pain Location - knee  -HP     Pain Intervention(s) Repositioned;Cold applied;Ambulation/increased activity;Elevated  -       Row Name 08/02/23 6934          Plan of Care Review    Plan of Care Reviewed With patient  -     Progress no change  -     Outcome Evaluation Pt amb 60'+100' with FWW aned CGAx2. Pt reproted dizziness throughout session with slow gait pattern and decreased stride length noted. No knee buckling observed. Activity limited by symptoms reported. HEP and precautions reviewed with pt. Recommend d/c home with assist and OPPT tomorrow if symptoms improve and pt is safe for stair training.  -       Row Name 08/02/23 5682          Therapy Assessment/Plan  (PT)    Rehab Potential (PT) good, to achieve stated therapy goals  -HP     Criteria for Skilled Interventions Met (PT) yes;meets criteria;skilled treatment is necessary  -HP     Therapy Frequency (PT) 2 times/day  -HP       Row Name 08/02/23 1607          Vital Signs    Pre Systolic BP Rehab 126  -HP     Pre Treatment Diastolic BP 61  -HP     Post Systolic BP Rehab 129  -HP     Post Treatment Diastolic BP 57  -HP     Pre Patient Position Supine  -HP     Intra Patient Position Standing  -HP     Post Patient Position Sitting  -HP       Row Name 08/02/23 1607          Positioning and Restraints    Pre-Treatment Position in bed  -HP     Post Treatment Position chair  -HP     In Chair notified nsg;reclined;sitting;call light within reach;encouraged to call for assist;exit alarm on;with family/caregiver;legs elevated;waffle cushion  -               User Key  (r) = Recorded By, (t) = Taken By, (c) = Cosigned By      Initials Name Provider Type     Suzanna Alcantara, PT Physical Therapist                   Outcome Measures       Row Name 08/02/23 1610          How much help from another person do you currently need...    Turning from your back to your side while in flat bed without using bedrails? 3  -HP     Moving from lying on back to sitting on the side of a flat bed without bedrails? 3  -HP     Moving to and from a bed to a chair (including a wheelchair)? 3  -HP     Standing up from a chair using your arms (e.g., wheelchair, bedside chair)? 3  -HP     Climbing 3-5 steps with a railing? 2  -HP     To walk in hospital room? 3  -HP     AM-PAC 6 Clicks Score (PT) 17  -HP     Highest level of mobility 5 --> Static standing  -HP       Row Name 08/02/23 1610          PADD    Diagnosis 1  -HP     Gender 2  -HP     Age Group 1  -HP     Gait Distance 1  -HP     Assist Level 1  -HP     Home Support 3  -HP     PADD Score 9  -HP     Patient Preference home with outpatient rehab  -HP     Prediction by PADD Score directly home (with  home health or out-patient rehab)  -       Row Name 08/02/23 1610          Functional Assessment    Outcome Measure Options AM-PAC 6 Clicks Basic Mobility (PT);PADD  -               User Key  (r) = Recorded By, (t) = Taken By, (c) = Cosigned By      Initials Name Provider Type     Suzanna Alcantara PT Physical Therapist                                 Physical Therapy Education       Title: PT OT SLP Therapies (Done)       Topic: Physical Therapy (Done)       Point: Mobility training (Done)       Learning Progress Summary             Patient Acceptance, E,D, VU,DU by  at 8/2/2023 1611                         Point: Home exercise program (Done)       Learning Progress Summary             Patient Acceptance, E,D, VU,DU by  at 8/2/2023 1611                         Point: Body mechanics (Done)       Learning Progress Summary             Patient Acceptance, E,D, VU,DU by  at 8/2/2023 1611                         Point: Precautions (Done)       Learning Progress Summary             Patient Acceptance, E,D, VU,DU by  at 8/2/2023 1611                                         User Key       Initials Effective Dates Name Provider Type [facility] 06/01/21 -  Suzanna Alcantara PT Physical Therapist PT                  PT Recommendation and Plan  Planned Therapy Interventions (PT): balance training, bed mobility training, gait training, home exercise program, patient/family education, transfer training, stretching, strengthening, stair training, ROM (range of motion)  Plan of Care Reviewed With: patient  Progress: no change  Outcome Evaluation: Pt amb 60'+100' with FWW aned CGAx2. Pt reproted dizziness throughout session with slow gait pattern and decreased stride length noted. No knee buckling observed. Activity limited by symptoms reported. HEP and precautions reviewed with pt. Recommend d/c home with assist and OPPT tomorrow if symptoms improve and pt is safe for stair training.     Time Calculation:   PT  Evaluation Complexity  History, PT Evaluation Complexity: 1-2 personal factors and/or comorbidities  Examination of Body Systems (PT Eval Complexity): total of 3 or more elements  Clinical Presentation (PT Evaluation Complexity): stable  Clinical Decision Making (PT Evaluation Complexity): low complexity  Overall Complexity (PT Evaluation Complexity): low complexity     PT Charges       Row Name 08/02/23 1303             Time Calculation    Start Time 1303  -HP      PT Received On 08/02/23  -HP      PT Goal Re-Cert Due Date 08/12/23  -HP         Timed Charges    21004 - PT Therapeutic Activity Minutes 10  -HP         Untimed Charges    PT Eval/Re-eval Minutes 54  -HP         Total Minutes    Timed Charges Total Minutes 10  -HP      Untimed Charges Total Minutes 54  -HP       Total Minutes 64  -HP                User Key  (r) = Recorded By, (t) = Taken By, (c) = Cosigned By      Initials Name Provider Type    HP Suzanna Alcantara, PT Physical Therapist                  Therapy Charges for Today       Code Description Service Date Service Provider Modifiers Qty    37823227894 HC PT THERAPEUTIC ACT EA 15 MIN 8/2/2023 Suzanna Alcantara, PT GP 1    29419927037 HC PT EVAL LOW COMPLEXITY 4 8/2/2023 Suzanna Alcantara, PT GP 1    87904558403 HC PT THER SUPP EA 15 MIN 8/2/2023 Suzanna Alcantara, PT GP 3            PT G-Codes  Outcome Measure Options: AM-PAC 6 Clicks Basic Mobility (PT), PADD  AM-PAC 6 Clicks Score (PT): 17  PT Discharge Summary  Anticipated Discharge Disposition (PT): home with assist, home with outpatient therapy services    Suzanna Alcantara PT  8/2/2023

## 2023-08-02 NOTE — DISCHARGE INSTRUCTIONS
InfuBLOCK - Patient Information    What is a pain pump?  The InfuBLOCK pump delivers post-operative, non-narcotic, numbing medication to the nerve near the surgical site for pain relief.     Where can I find information about my pain pump?           For more information about your pain pump, scan the QR code.  For additional patient resources, visit [email or web address].                                                                                               While your physician is your primary source for information about your treatment there may be times during your treatment that you need assistance with your infusion pump.     If you need assistance take the following steps:    The [facility] Hotline is Here for You 24/7.  Please call [phone] for the following concerns or complications:    Answers to questions about your infusion pump                 Tubing disconnect  Assistance with pump alarms                                                      Dislodged catheter  Excessive leakage noted from pump                                         Inadequate pain control    2.   [facility]: [phone] is available 24/7 for any further needs or concerns about medication or pain control. Nerve Catheter Removal Instructions  When your device is empty:    Remove your catheter by pulling the dressing off slowly (like you would remove a regular bandage). The catheter should pull right out of the skin.  Check that the BLUE tip is intact.                                                                                     If the catheter is stuck, reposition your   extremity and pull slowly until removed.  *If catheter is HURTING and WON'T come out, stop and call [phone] for further assistance.    Remove medication bag from the black carrying case.  Cut the tubing on right and left side of pump, and discard the medication bag and tubing into  garbage.  Place the pump and black carrying case into the plastic bag and then place this into the return box.  Seal box with blue stickers and return to [employer] postal service. THIS IS PRE-PAID POSTAGE.SMI COLD THERAPY - PATIENT INSTRUCTION SHEET    Cold Compression Therapy for your comfort and rehabilitation  Your caregivers want you to be productive in your rehab and comfortable during your stay. In keeping with those goals, you will be receiving an SMI Cold Therapy Wrap to help ease post-operative pain and swelling that might keep you from getting back on track! Your SMI Cold Therapy Wrap is effective and simple-to-use, and you will be encouraged to apply it throughout your hospital stay and at home through the duration of your recovery.    When you are ready to go home  Be sure to take your SMI Cold Therapy Wrap and both sets of Gel Bags with you for continued comfort and use throughout your rehabilitation. If you don't already have them, ask your nurse or aide to retrieve your SMI Gel Bags from the patient freezer.    Home use precautions  Always follow your medical professional's application instructions upon discharge. Your SMI Cold Therapy Wrap and Gel Bags are designed to last for months following your surgery. Never heat the Gel Bags unless specified by your healthcare provider. Supervision is advised when using this product on children or geriatric patients. To avoid danger of suffocation, please keep the outer plastic packaging away from children & pets.    Cold Therapy Instructions  Place Gel Bags in a freezer set _ of the way to max temperature for at least (4) hours. For best results, lay the Gel Bags flat and [id number] in the freezer. Once frozen, slide Gel Bags into the gel pouch and secure your wrap to the affected area with the straps.  Gel wraps that have been stored in a freezer for an extended period of time may require a (10) minute period of softening up in a room temperature environment  before application.  The gel pouch acts as a protective barrier. NEVER place frozen bags directly onto skin, as this may cause frostbite injury.  The [facility] Cold Therapy Wrap is designed to be able to be worm while ambulating. The compression straps can be secured well enough so that the Wrap won't fall off while moving.  Wrap Application Videos can be viewed at [employer].  An additional protective barrier such as clothing, a washcloth, hand-towel or pillowcase may be used during prolonged treatment applications.  The Gel-Pouch and Wrap are both Latex-Free and the Gel Bag ingredients are non toxic.    SMI Wrap care instructions  The [facility] Cold Therapy Wrap may be hand washed and hung to dry when needed.    [facility] re-order information  Additional [facility] body specific wraps and/or Gel Bags can be re-ordered from [employer] or call [phone] ([phone])  EXOFIN CARING FOR YOUR WOUND    AFTER exofin Fusion SKIN CLOSURE SYSTEM HAS BEEN APPLIED    YOUR HEALTHCARE PROFESSIONAL HAS CHOSEN TO USE exofin Fusion SKIN CLOSURE SYSTEM TO CLOSE YOUR WOUND.    exocin Fusion is the combination of a mesh and liquid adhesive that allows the incision or wound to be held together during the healing process.    exocin Fusion should remain in place until your healthcare professional; has determined that adequate healing has occurred, which is usually anywhere between 7 to 14 days. In most cases, exofin Fusion is easily removed with little or no discomfort.    In the event that you notice that exofin Fusion is beginning to loosen or may be coming off, contact your healthcare professional.    The following information is provided to help you understand how to care for your incision and is based on the FDA-cleared product labeling.    You should always follow the instructions of your healthcare provider.    THINGS TO KNOW    BATHING OR SHOWERING    If directed by your healthcare professional, you may occasionally and  briefly wet your incision or wound that was treated with exofin Fusion in the shower or bath. Do not soak or scrub your incision or wound. Do not swim or soak your incision or wound in water. After showering or bathing, gently blot your incision or wound dry with a soft towel. If a dry protective dressing is being used over exofin Fusion, it should be replaced with a fresh, dry protective dressing after showering or bathing as directed by your healthcare practitioner. Care should also be taken so that any tape that may be part of the dry protective dressing does not come into contact with exofin Fusion because when the tape is removed, it may also remove exofin Fusion.    WOUND HEALING  If you experience any redness, swelling, discomfort, warmth or pus, contact your healthcare professional and he or she will determine how your incision or wound is healing and take the necessary steps to address any issues.    EXERCISE  Do not engage in strenuous exercise that may cause additional stress on your incision or wound. Follow your healthcare professional's guidance about when you can return to your normal activities.    OINTMENTS OR LIQUIDS  Topical ointments, liquids or any other products (other than dry bandages) should not be applied to the incision while exofin Fusion is in place. These may loosen exofin Fusion from the skin before it has completely healed.    REMOVING exofin Fusion  Your healthcare professional will determine when the healing process of your incision or wound has been completed and exofin Fusion is ready to be removed, which is usually between 7 to 14 days. When healing is complete, your healthcare professional will carefully peel off the exofin Fusion.    Prior to removal, do not scratch, rub or pick at the mesh. This may loosen the adhesive and mesh before the skin is healed.  In the event that you notice the exofin Fusion is beginning to loosen and may be coming off or comes off with the  skin/wound, contract your healthcare professional.    For complete directions on the use of exofin Fusion, please refer to instructions for Use (IFU) included in the product packaging.  IF YOU HAVE ANY QUESTIONS OR CONCERNS ABOUT exofin Fusion PLEASE CONTACT YOUR HEALTHCARE PROFESSIONAL.

## 2023-08-02 NOTE — OP NOTE
Orthopaedics Operative Report    PREOPERATIVE DIAGNOSIS: Primary osteoarthritis left knee    POSTOPERATIVE DIAGNOSIS: Same    PROCEDURE PERFORMED: Left total knee arthroplasty, CPT 50146    SURGEON: Parish Head MD    ANESTHESIA:  General with Block    STAFF:  Circulator: Sorin Demarco RN; Yoanna Gross RN  Scrub Person: Jackie Davidson; Nasim Coulter  Vendor Representative: Will Mancuso  Nursing Assistant: Nery Chin PCT  Assistant: Kaelyn Omer PA-C    TOURNIQUET TIME: 92 min    ESTIMATED BLOOD LOSS: 50 mL    COMPLICATIONS: None apparent.    RELEASES:None required after approach, osteophyte removal, and bone cuts    Surgical Approach: Knee Medial Parapatellar     TXA: IV    IMPLANTS:     Implant Name Type Inv. Item Serial No.  Lot No. LRB No. Used Action   DEV CONTRL TISS STRATAFIX SYMM PDS PLUS YOSHI CT-1 45CM - [id number] Implant DEV CONTRL TISS STRATAFIX SYMM PDS PLUS YOSHI CT-1 45CM  [employer]  Left 1 Implanted   CMT BONE R 1X40 - [id number] Implant CMT BONE R 1X40  [employer] [id number] Left 2 Implanted   COMP FEM/KN PERSONA CR CMT COCR STD SZ9 LT - [id number] Implant COMP FEM/KN PERSONA CR CMT COCR STD SZ9 LT  [employer] [id number] Left 1 Implanted   PAT KN PERSONA ALLPOLY CMT 35MM - [id number] Implant PAT KN PERSONA ALLPOLY CMT 35MM  [employer] [id number] Left 1 Implanted   STEM TIB/KN PERSONA CMT 5D SZH LT - [id number] Implant STEM TIB/KN PERSONA CMT 5D SZH LT  [employer] [id number] Left 1 Implanted   ART/SRF KN PERSONA/VE PS GH 8TO11 11MM LT - [id number] Implant ART/SRF KN PERSONA/VE PS GH 8TO11 11MM LT  [employer] [id number] Left 1 Implanted       Warren Persona CR femur size 9 standard  size H tibia  35 patella button   Size 11 Vitamin E Medial Congruent highly crosslinked polyethylene articular surface    PREOPERATIVE ANTIBIOTICS: Kefzol    REFERRING PHYSICIAN: Fabian Jauregui MD    INDICATIONS: Failure of nonoperative treatment including  injections, bracing, and activity modification.    DESCRIPTION OF PROCEDURE: After informed consent was obtained, the patient was taken to the operating room. The patient was given a dose of IV antibiotics prior to incision.After the smooth induction of general and single shot regional anesthesia, the patient's left lower extremity was prepped and draped in the usual fashion for this type of procedure. We performed a timeout to verify site and the procedure to be performed.  We began with exsanguination of the left lower extremity using an Esmarch bandage and inflation of tourniquet to 300 mmHg. We made our standard midline incision and medial parapatellar approach. We took 20% of the quadriceps tendon medially and a sleeve of tissue around the patella for repair as well a sliver of patellar tendon. Dissected extra-ostially but subperiosteally on the medial side taking off the superficial and deep MCL from the proximal tibia. These were protected throughout the procedure. Visible medial meniscus was excised. The retropatellar fat pad was excised. The ACL and visible lateral meniscus was excised as well as the synovium from the anterior surface of the distal femur.  Osteophytes were removed from the distal femur and proximal tibia at this point.       We then everted the patella and this was resurfaced, restoring patellar height. The patellar height was 26 mm preoperatively and we cut the patella to 16 mm and sized the patella to a 35.  The lugholes were drilled and the component slightly medialized.      We then turned our attention to preparation of the femur. We placed our intramedullary distal femoral guide into place set on 5 degrees of valgus and due to preoperative flexion contracture, we took an extra 2 mm of bone off of the distal femur. The distal femur was cut protecting medial and lateral structures. We then marked [place]'s line and sized our femur to be a size 9. We marked 5ø of external rotation  which correlated well with Graves's line. We then secured this block into place and made our anterior, posterior, and chamfer cuts. The pins were removed and the bone cuts were completed and freshened up. We then excised our posterior cruciate ligament and exposed the proximal tibia. We took 10 mm of bone off the lateral side. We protected medial and lateral structures during our cut as well as the patellar tendon. We completed the cut. We sized the tibia to be a size H. We then removed meniscus from the back of the knee. We used our flexion extension blocks to make sure our flexion and extension gaps were acceptable. We then completed the preparation of the tibia, aiming the center of the baseplate at the medial third of the tibial tubercle.     We then completed the preparation of our femoral component.  We then trialed and were stable on the medial side at 0ø, 30ø, 60ø, 90ø and 120ø. The lug holes were then drilled.  After thorough irrigation of the knee, we then injected our periarticular injection into the posterior medial aspect of the knee which consisted of 20 ml of NS, 20 ml of 0.5% lidocaine with epi, 20 ml of 0.5% bupivicaine, 30 mg of toradol, and 8 mg of morphine. We then cemented the final components into place. Once the cement had cured, we removed excess cement. We then trialed and a size 11 polyethylene spacer had good stability and full range of motion.  We then deflated the tourniquet prior to placement of our final polyethylene spacer. Any bleeding seen in the back of the knee was controlled and we obtained hemostasis. The final spacer was then secured into place. We then used a final irrigation consisting of Irricept and diluted Betadine throughout the knee. We then closed our deep parapatellar approach using 0 Ethibond in an interrupted fashion.  We then closed our subcutaneous layer using running 2-0 Vicryl and interrupted 2-0 Vicryl. We closed our skin using a running 3-0 Monocryl. We  placed an Exofin Fusion dressing system over the incision and took down the drapes. The patient was transferred back to their hospital bed and then taken to the recovery room in stable condition. All counts were correct postoperatively. I performed the case.    First assistant: Kaelyn Omer PA-C    The skilled assistance of the above noted first assistant was necessary during this complex surgical procedure.  The surgical assistant assisted with every aspect of the operation including, but not limited to, proper and safe positioning of the patient, obtaining adequate surgical exposure, manipulation of surgical instruments to make the proper bone cuts, cementing of the final implants, the continual process of hemostasis during the procedure itself in addition to surgical wound closure and removal of the patient from the operating table and returning the patient back to the [facility].  The assistance of the surgical assistant allowed me to perform the most sensitive and technical potions of this operation using 2 hands, thus enhancing efficiency and patient safety.  This would not be possible without the help of a skilled assistant familiar with the procedure and capable of safely performing the aforementioned tasks.       POSTOPERATIVE PLAN:  1. Weight bearing as tolerated left lower extremity.  2. The patient will receive an indwelling low femoral nerve block in the recovery room.  3.  Patient will receive a dose of IV antibiotics prior to discharge home  4.  Full dose aspirin daily beginning tomorrow morning and continuing for 6 weeks for DVT prophylaxis.  5.  The [facility] in the next 24 to 48 hours  6.  Discharge home once the patient has met criteria  7.  Patient will be discharged home with a prescription for oxycodone, Mobic or Ibuprofen, Tylenol, Colace, and Zofran in addition to their DVT prophylaxis  8.  Follow up with me in the  office in 3 weeks    Parish Head M.D.*

## 2023-08-03 VITALS
TEMPERATURE: 98.4 F | DIASTOLIC BLOOD PRESSURE: 60 MMHG | SYSTOLIC BLOOD PRESSURE: 109 MMHG | HEART RATE: 59 BPM | BODY MASS INDEX: 23.11 KG/M2 | RESPIRATION RATE: 18 BRPM | HEIGHT: 69 IN | WEIGHT: 156 LBS | OXYGEN SATURATION: 99 %

## 2023-08-03 PROBLEM — N99.89 POSTOPERATIVE URINARY RETENTION: Status: ACTIVE | Noted: 2023-08-03

## 2023-08-03 PROBLEM — M17.12 ARTHRITIS OF LEFT KNEE: Status: ACTIVE | Noted: 2023-08-03

## 2023-08-03 PROBLEM — R33.8 POSTOPERATIVE URINARY RETENTION: Status: ACTIVE | Noted: 2023-08-03

## 2023-08-03 LAB
ANION GAP SERPL CALCULATED.3IONS-SCNC: 12 MMOL/L (ref 5–15)
BUN SERPL-MCNC: 37 MG/DL (ref 8–23)
BUN/CREAT SERPL: 28.2 (ref 7–25)
CALCIUM SPEC-SCNC: 8.6 MG/DL (ref 8.6–10.5)
CHLORIDE SERPL-SCNC: 103 MMOL/L (ref 98–107)
CO2 SERPL-SCNC: 23 MMOL/L (ref 22–29)
CREAT SERPL-MCNC: 1.31 MG/DL (ref 0.76–1.27)
DEPRECATED RDW RBC AUTO: 41.6 FL (ref 37–54)
EGFRCR SERPLBLD CKD-EPI 2021: 57.8 ML/MIN/1.73
ERYTHROCYTE [DISTWIDTH] IN BLOOD BY AUTOMATED COUNT: 12.3 % (ref 12.3–15.4)
GLUCOSE SERPL-MCNC: 147 MG/DL (ref 65–99)
HCT VFR BLD AUTO: 31 % (ref 37.5–51)
HGB BLD-MCNC: 10.2 G/DL (ref 13–17.7)
MCH RBC QN AUTO: 30.2 PG (ref 26.6–33)
MCHC RBC AUTO-ENTMCNC: 32.9 G/DL (ref 31.5–35.7)
MCV RBC AUTO: 91.7 FL (ref 79–97)
PLATELET # BLD AUTO: 173 10*3/MM3 (ref 140–450)
PMV BLD AUTO: 10.4 FL (ref 6–12)
POTASSIUM SERPL-SCNC: 4.8 MMOL/L (ref 3.5–5.2)
RBC # BLD AUTO: 3.38 10*6/MM3 (ref 4.14–5.8)
SODIUM SERPL-SCNC: 138 MMOL/L (ref 136–145)
WBC NRBC COR # BLD: 11.1 10*3/MM3 (ref 3.4–10.8)

## 2023-08-03 PROCEDURE — 63710000001 OXYCODONE-ACETAMINOPHEN 5-325 MG TABLET: Performed by: ORTHOPAEDIC SURGERY

## 2023-08-03 PROCEDURE — 63710000001 LISINOPRIL 20 MG TABLET: Performed by: NURSE PRACTITIONER

## 2023-08-03 PROCEDURE — 85027 COMPLETE CBC AUTOMATED: CPT | Performed by: NURSE PRACTITIONER

## 2023-08-03 PROCEDURE — 97530 THERAPEUTIC ACTIVITIES: CPT

## 2023-08-03 PROCEDURE — A9270 NON-COVERED ITEM OR SERVICE: HCPCS | Performed by: NURSE PRACTITIONER

## 2023-08-03 PROCEDURE — G0378 HOSPITAL OBSERVATION PER HR: HCPCS

## 2023-08-03 PROCEDURE — A9270 NON-COVERED ITEM OR SERVICE: HCPCS | Performed by: ORTHOPAEDIC SURGERY

## 2023-08-03 PROCEDURE — 97110 THERAPEUTIC EXERCISES: CPT

## 2023-08-03 PROCEDURE — 99024 POSTOP FOLLOW-UP VISIT: CPT | Performed by: ORTHOPAEDIC SURGERY

## 2023-08-03 PROCEDURE — 80048 BASIC METABOLIC PNL TOTAL CA: CPT | Performed by: NURSE PRACTITIONER

## 2023-08-03 PROCEDURE — 97116 GAIT TRAINING THERAPY: CPT

## 2023-08-03 PROCEDURE — 63710000001 ASPIRIN 325 MG TABLET DELAYED-RELEASE: Performed by: ORTHOPAEDIC SURGERY

## 2023-08-03 PROCEDURE — 63710000001 IBUPROFEN 600 MG TABLET: Performed by: ORTHOPAEDIC SURGERY

## 2023-08-03 RX ADMIN — OXYCODONE HYDROCHLORIDE AND ACETAMINOPHEN 1 TABLET: 5; 325 TABLET ORAL at 13:18

## 2023-08-03 RX ADMIN — IBUPROFEN 600 MG: 600 TABLET ORAL at 13:18

## 2023-08-03 RX ADMIN — ASPIRIN 325 MG: 325 TABLET, COATED ORAL at 08:51

## 2023-08-03 RX ADMIN — LISINOPRIL 20 MG: 20 TABLET ORAL at 08:51

## 2023-08-03 NOTE — CASE MANAGEMENT/SOCIAL WORK
Continued Stay Note   [facility]     Patient Name: Roque Bob  MRN: [id number]  Today's Date: 8/3/2023    Admit Date: 8/2/2023    Plan: home with [facility]   Discharge Plan       Row Name 08/03/23 1037       Plan    Plan home with [facility]    Patient/Family in Agreement with Plan yes    Plan Comments Pt lives in [facility] with his wife. He reports he is independent with ADLs and denies use of any DME. He is followed by his PCP and has drug coverage. His plan for discharge is to return home with [facility]. [facility] has accepted the referral and will contact him for his first visit.    Final Discharge Disposition Code 01 - home or self-care                   Discharge Codes    No documentation.                 Expected Discharge Date and Time       Expected Discharge Date Expected Discharge Time    Aug 2, 2023               Giovanna Josue RN

## 2023-08-03 NOTE — PLAN OF CARE
Patient had a projectile vomiting at the beginning of the shift right after dinner. C/O light headiness/dizziness while transferring to the bed.  Requires 2 assist due to weakness.Elastic bandage CDI. Dorsal/planter flexion equally moderate. Denies pain/discomfort at this time. Anti nausea meds declined. VSS on RA. Call light in reach.

## 2023-08-03 NOTE — DISCHARGE PLACEMENT REQUEST
"Heather Bob (73 y.o. Male)       Date of Birth   1950    Social Security Number       [place]   [address] [place] [address]    Home Phone   [phone]    MRN   [id number]       Holiness   Restoration    Marital Status                               Admission Date   8/2/23    Admission Type   Elective    Admitting Provider   Parish Head MD    Attending Provider   Parish Head MD    Department, Room/Bed   [facility] 3H, S378/1       Discharge Date       Discharge Disposition       Discharge Destination                                 Attending Provider: Parish Head MD    Allergies: No Known Allergies    Isolation: None   Infection: None   Code Status: Not on file    Ht: 175.3 cm (69\")   Wt: 70.8 kg (156 lb)    Admission Cmt: None   Principal Problem: Status post total left knee replacement [Z96.652]                   Active Insurance as of 8/2/2023       Primary Coverage       Payor Plan Insurance Group Employer/Plan Group    MEDICARE MEDICARE A & B        Payor Plan Address Payor Plan Phone Number Payor Plan Fax Number Effective Dates    [address] [phone]  2/1/2016 - None Entered    [facility] [address]         Subscriber Name Subscriber Birth Date Member ID       HEATHER BOB 1950 [id number]               Secondary Coverage       Payor Plan Insurance Group Employer/Plan Group    [facility] SUPP [id number]       Payor Plan Address Payor Plan Phone Number Payor Plan Fax Number Effective Dates    [address]   12/1/2016 - None Entered    [facility] [id number]         Subscriber Name Subscriber Birth Date Member ID       HEATHER BOB 1950 [id number]                     Emergency Contacts        (Rel.) Home Phone Work Phone Mobile Phone    BobNai (Spouse) -- -- [phone]             [address]  [address]  [facility] [address]  Phone:  " [phone]  Fax:  [phone] Date: Aug 2, 2023      Ambulatory Referral to Physical Therapy POST OP     Patient:  Roque Bob MRN:  [id number]   [address] [place] [address] [address] [place] [address] :  1950  SSN:    Phone: [phone] Sex:  M      INSURANCE PAYOR PLAN GROUP # SUBSCRIBER ID   Primary:  Secondary:    MEDICARE  ANTHEM [place] [id number]  [id number]    [id number]  [id number]      Referring Provider Information:  VONDA HEAD Phone: [phone] Fax: [phone]       Referral Information:   # Visits:  1 Referral Type: Physical Therapy [AE1]   Urgency:  Routine Referral Reason: Specialty Services Required   Start Date: Aug 2, 2023 End Date:  To be determined by Insurer   Diagnosis: Primary osteoarthritis of left knee (M17.12 [ICD-10-CM] 715.16 [ICD-9-CM])      Refer to Dept:   Refer to Provider:   Refer to Provider Phone:   [facility]  Specialty needed: POST OP  Follow-up needed: Yes     This document serves as a request of services and does not constitute Insurance authorization or approval of services.  To determine eligibility, please contact the members Insurance carrier to verify and review coverage.     If you have medical questions regarding this request for services. Please contact [address] at [phone] during normal business hours.        Authorizing Provider:Vonda Head MD  Authorizing Provider's NPI: [id number]  Order Entered By: Vonda Head MD 2023  7:06 AM     Electronically signed by: Vonda Head MD 2023  7:06 AM       Insurance Information                  MEDICARE/MEDICARE A & B Phone: [phone]    Subscriber: Roque Bob Subscriber#: [id number]    Group#: -- Precert#: --        [facility] Phone: --    Subscriber: Roque Bob Subscriber#: [id number]    Group#: [id number] Precert#: --

## 2023-08-03 NOTE — NURSING NOTE
Patient  c/o abdominal discomfort/pressure at 2200 hours. Bladder volume assessed for distention. 800 ml of urine return noted via straight cath.Symptom was relieved instantaneously per patient.

## 2023-08-03 NOTE — THERAPY TREATMENT NOTE
Patient Name: Roque Bob  : 1950    MRN: [id number]                              Today's Date: 8/3/2023       Admit Date: 2023    Visit Dx:     ICD-10-CM ICD-9-CM   1. Primary osteoarthritis of left knee  M17.12 715.16     Patient Active Problem List   Diagnosis    Primary osteoarthritis of left knee    Status post total left knee replacement    Hypertension    Elevated hemoglobin A1c    Arthritis of left knee     Past Medical History:   Diagnosis Date    Arthritis     Hypertension     Prostate CA     Rotator cuff syndrome Right - 10/1/12 / Left - 22    Surgery on 14     Past Surgical History:   Procedure Laterality Date    COLONOSCOPY      EXTRACORPOREAL SHOCK WAVE LITHOTRIPSY (ESWL) Bilateral 2021    Procedure: EXTRACORPOREAL SHOCKWAVE LITHOTRIPSY BILATERAL;  Surgeon: Cristiano Foss Jr., MD;  Location: [facility];  Service: Urology;  Laterality: Bilateral;    HAND SURGERY Right     PROSTATE SURGERY      SHOULDER SURGERY Right     RCR    SHOULDER SURGERY Left 2022    [facility]Parish Head [facility] [place]    TOTAL KNEE ARTHROPLASTY Left 2023    Procedure: TOTAL KNEE ARTHROPLASTY - LEFT;  Surgeon: Parish Head MD;  Location: [facility];  Service: Orthopedics;  Laterality: Left;      General Information       Row Name 23 1037          Physical Therapy Time and Intention    Document Type therapy note (daily note)  -SS     Mode of Treatment physical therapy  -SS       Row Name 23 1037          General Information    Patient Profile Reviewed yes  -SS     Existing Precautions/Restrictions fall;other (see comments)  adductor canal nerve cath  -SS     Barriers to Rehab hearing deficit  -SS       Row Name 23 1037          Cognition    Orientation Status (Cognition) oriented x 3  -SS       Row Name 23 1037          Safety Issues, Functional Mobility    Safety Issues Affecting Function (Mobility) insight into  deficits/self-awareness;positioning of assistive device;safety precaution awareness;safety precautions follow-through/compliance;sequencing abilities  -     Impairments Affecting Function (Mobility) balance;endurance/activity tolerance;pain;strength;range of motion (ROM);postural/trunk control  -               User Key  (r) = Recorded By, (t) = Taken By, (c) = Cosigned By      Initials Name Provider Type     Brandie Yeboah, PT Physical Therapist                   Mobility       Row Name 08/03/23 1038          Bed Mobility    Comment, (Bed Mobility) up in chair  -       Row Name 08/03/23 1038          Sit-Stand Transfer    Sit-Stand [place] (Transfers) contact guard;verbal cues  -     Assistive Device (Sit-Stand Transfers) walker, front-wheeled  -SS     Comment, (Sit-Stand Transfer) VC for hand placement, appropriate alignment, lowering with eccentric control, stepping out LLE  -       Row Name 08/03/23 1038          Gait/Stairs (Locomotion)    [place] Level (Gait) contact guard;verbal cues  -     Assistive Device (Gait) walker, front-wheeled  -     Distance in Feet (Gait) 200  -SS     Deviations/Abnormal Patterns (Gait) bilateral deviations;weight shifting decreased;stride length decreased;gait speed decreased;melissa decreased  -     Bilateral Gait Deviations forward flexed posture;heel strike decreased  -     [place] Level (Stairs) contact guard;verbal cues;2 person assist  -SS     Assistive Device (Stairs) cane, straight  -SS     Handrail Location (Stairs) other (see comments)  HHA  -SS     Number of Steps (Stairs) 4  2 w/PT support, 2 w/daughter support  -SS     Ascending Technique (Stairs) step-to-step  -SS     Descending Technique (Stairs) step-to-step  -SS     Comment, (Gait/Stairs) Pt. ambulated with a step through gait pattern at a decreased speed. VC for upright posture, continuous forward progression of AD, increased heel strike. He performed 2 steps w/STC and HHA. VC  for sequencing w/RLE leading in ascending and LLE leading in descending. No buckling or LOB noted. Activity limited by pain and fatigue. Daughter present and educated for assist to be provided including use of gait belt, body positioning, hand placement.  -       Row Name 08/03/23 1038          Mobility    Extremity Weight-bearing Status left lower extremity  -     Left Lower Extremity (Weight-bearing Status) weight-bearing as tolerated (WBAT)  -               User Key  (r) = Recorded By, (t) = Taken By, (c) = Cosigned By      Initials Name Provider Type     Brandie Yeboah PT Physical Therapist                   Obj/Interventions       Row Name 08/03/23 1045          Range of Motion Comprehensive    Comment, General Range of Motion L knee AROM: -15-65  -       Row Name 08/03/23 1045          Motor Skills    Therapeutic Exercise knee;ankle  -[facility] Name 08/03/23 1045          Knee (Therapeutic Exercise)    Knee (Therapeutic Exercise) AROM (active range of motion);isometric exercises;strengthening exercise  -     Knee AROM (Therapeutic Exercise) left;sitting;flexion;10 repetitions  -     Knee Isometrics (Therapeutic Exercise) left;quad sets;10 repetitions  -     Knee Strengthening (Therapeutic Exercise) left;heel slides;SLR (straight leg raise);SAQ (short arc quad);LAQ (long arc quad);10 repetitions  -[facility] Name 08/03/23 1045          Ankle (Therapeutic Exercise)    Ankle (Therapeutic Exercise) AROM (active range of motion)  -     Ankle AROM (Therapeutic Exercise) bilateral;dorsiflexion;plantarflexion;10 repetitions  -       Row Name 08/03/23 1045          Balance    Balance Assessment sitting static balance;sitting dynamic balance;sit to stand dynamic balance;standing static balance;standing dynamic balance  -     Static Sitting Balance independent  -     Dynamic Sitting Balance standby assist  -     Position, Sitting Balance unsupported;sitting in chair  -     Sit to Stand  Dynamic Balance contact guard  -SS     Static Standing Balance contact guard  -     Dynamic Standing Balance contact guard  -     Position/Device Used, Standing Balance supported;walker, front-wheeled  -     Balance Interventions sitting;standing;sit to stand;supported;static;dynamic  -               User Key  (r) = Recorded By, (t) = Taken By, (c) = Cosigned By      Initials Name Provider Type     Brandie Yeboah, PT Physical Therapist                   Goals/Plan    No documentation.                  Clinical Impression       Row Name 08/03/23 1048          Pain    Pretreatment Pain Rating 3/10  -     Posttreatment Pain Rating 4/10  -     Pain Location - Side/Orientation Left  -     Pain Location generalized  -     Pain Location - knee  -     Pain Intervention(s) Cold applied;Repositioned;Ambulation/increased activity;Elevated  -     Additional Documentation Pain Scale: Numbers Pre/Post-Treatment (Group)  -[facility] Name 08/03/23 1048          Plan of Care Review    Plan of Care Reviewed With patient;daughter  -     Progress improving  -     Outcome Evaluation Pt. presents below baseline function w/decreased strength and AROM of L knee and acute pain affecting his ability to safely participate in functional mobility. He performed transfers, ambulated 200' and navigated 4 steps w/contact guard assist. Activity limited by pain and fatigue. Pt. asymptomatic, no buckling or LOB noted. Reviewed knee precautions, HEP. Continue IPPT to progress as tolerated. Recommend FWW.  -       Row Name 08/03/23 1048          Therapy Assessment/Plan (PT)    Rehab Potential (PT) good, to achieve stated therapy goals  -     Criteria for Skilled Interventions Met (PT) yes;meets criteria;skilled treatment is necessary  -     Therapy Frequency (PT) 2 times/day  -       Row Name 08/03/23 1048          Vital Signs    Pre Systolic BP Rehab --  VSS  -       Row Name 08/03/23 1048          Positioning and  Restraints    Pre-Treatment Position sitting in chair/recliner  -SS     Post Treatment Position chair  -SS     In Chair notified nsg;reclined;call light within reach;encouraged to call for assist;exit alarm on;with family/caregiver;with nsg;waffle cushion;legs elevated  -               User Key  (r) = Recorded By, (t) = Taken By, (c) = Cosigned By      Initials Name Provider Type     Brandie Yeboah PT Physical Therapist                   Outcome Measures       Row Name 08/03/23 1050 08/03/23 0850       How much help from another person do you currently need...    Turning from your back to your side while in flat bed without using bedrails? 3  -SS 3  -RC    Moving from lying on back to sitting on the side of a flat bed without bedrails? 3  -SS 3  -RC    Moving to and from a bed to a chair (including a wheelchair)? 3  -SS 3  -RC    Standing up from a chair using your arms (e.g., wheelchair, bedside chair)? 3  -SS 3  -RC    Climbing 3-5 steps with a railing? 3  -SS 2  -RC    To walk in hospital room? 3  -SS 3  -RC    AM-PAC 6 Clicks Score (PT) 18  -SS 17  -RC    Highest level of mobility 6 --> Walked 10 steps or more  - 5 --> Static standing  -      Row Name 08/03/23 1050          Functional Assessment    Outcome Measure Options AM-PAC 6 Clicks Basic Mobility (PT)  -               User Key  (r) = Recorded By, (t) = Taken By, (c) = Cosigned By      Initials Name Provider Type    Brandie Miller PT Physical Therapist    Marion Mcgovern, RN Registered Nurse                                 Physical Therapy Education       Title: PT OT SLP Therapies (Done)       Topic: Physical Therapy (Done)       Point: Mobility training (Done)       Learning Progress Summary             Patient Eager, E,H, RONY,JAZIEL,NR by  at 8/3/2023 1051    Comment: Reviewed safety/technique w/transfers, ambulation, stair navigation, car transfers, HEP, PT POC, knee precautions    Acceptance, E,D, RONY,JAZIEL by  at 8/2/2023 1611   Family  Eager, E,H, VU,DU,NR by  at 8/3/2023 1051    Comment: Reviewed safety/technique w/transfers, ambulation, stair navigation, car transfers, HEP, PT POC, knee precautions                         Point: Home exercise program (Done)       Learning Progress Summary             Patient Eager, E,H, VU,DU,NR by  at 8/3/2023 1051    Comment: Reviewed safety/technique w/transfers, ambulation, stair navigation, car transfers, HEP, PT POC, knee precautions    Acceptance, E,D, VU,DU by  at 8/2/2023 1611   Family Eager, E,H, VU,DU,NR by  at 8/3/2023 1051    Comment: Reviewed safety/technique w/transfers, ambulation, stair navigation, car transfers, HEP, PT POC, knee precautions                         Point: Body mechanics (Done)       Learning Progress Summary             Patient Eager, E,H, VU,DU,NR by  at 8/3/2023 1051    Comment: Reviewed safety/technique w/transfers, ambulation, stair navigation, car transfers, HEP, PT POC, knee precautions    Acceptance, E,D, VU,DU by  at 8/2/2023 1611   Family Eager, E,H, VU,DU,NR by  at 8/3/2023 1051    Comment: Reviewed safety/technique w/transfers, ambulation, stair navigation, car transfers, HEP, PT POC, knee precautions                         Point: Precautions (Done)       Learning Progress Summary             Patient Eager, E,H, VU,DU,NR by  at 8/3/2023 1051    Comment: Reviewed safety/technique w/transfers, ambulation, stair navigation, car transfers, HEP, PT POC, knee precautions    Acceptance, E,D, VU,DU by  at 8/2/2023 1611   Family Eager, E,H, VU,DU,NR by  at 8/3/2023 1051    Comment: Reviewed safety/technique w/transfers, ambulation, stair navigation, car transfers, HEP, PT POC, knee precautions                                         User Key       Initials Effective Dates Name Provider Type Discipline     06/01/21 -  Suzanna Alcantara, PT Physical Therapist PT     06/01/21 -  Brandie Yeboah PT Physical Therapist PT                  PT Recommendation and  Plan     Plan of Care Reviewed With: patient, daughter  Progress: improving  Outcome Evaluation: Pt. presents below baseline function w/decreased strength and AROM of L knee and acute pain affecting his ability to safely participate in functional mobility. He performed transfers, ambulated 200' and navigated 4 steps w/contact guard assist. Activity limited by pain and fatigue. Pt. asymptomatic, no buckling or LOB noted. Reviewed knee precautions, HEP. Continue IPPT to progress as tolerated. Recommend FWW.     Time Calculation:         PT Charges       Row Name 08/03/23 1052             Time Calculation    Start Time 0823  -SS      PT Received On 08/03/23  -SS         Timed Charges    93607 - PT Therapeutic Exercise Minutes 15  -SS      66210 - Gait Training Minutes  15  -SS      58829 - PT Therapeutic Activity Minutes 8  -SS         Total Minutes    Timed Charges Total Minutes 38  -SS       Total Minutes 38  -SS                User Key  (r) = Recorded By, (t) = Taken By, (c) = Cosigned By      Initials Name Provider Type     Brandie Yeboah, PT Physical Therapist                  Therapy Charges for Today       Code Description Service Date Service Provider Modifiers Qty    46289154699 HC PT THER PROC EA 15 MIN 8/3/2023 Brandie Yeboah, PT GP 1    14892031683 HC GAIT TRAINING EA 15 MIN 8/3/2023 Brandie Yeboah, PT GP 1    01359316849 HC PT THERAPEUTIC ACT EA 15 MIN 8/3/2023 Brandie Yeboah, PT GP 1    82016048886 HC PT THER SUPP EA 15 MIN 8/3/2023 Brandie Yeboah, PT GP 2            PT G-Codes  Outcome Measure Options: AM-PAC 6 Clicks Basic Mobility (PT)  AM-PAC 6 Clicks Score (PT): 18  PT Discharge Summary  Anticipated Discharge Disposition (PT): home with assist, home with outpatient therapy services    Brandie Yeboah PT  8/3/2023

## 2023-08-03 NOTE — PLAN OF CARE
Goal Outcome Evaluation:  Plan of Care Reviewed With: patient, daughter        Progress: improving  Outcome Evaluation: Pt. presents below baseline function w/decreased strength and AROM of L knee and acute pain affecting his ability to safely participate in functional mobility. He performed transfers, ambulated 200' and navigated 4 steps w/contact guard assist. Activity limited by pain and fatigue. Pt. asymptomatic, no buckling or LOB noted. Reviewed knee precautions, HEP. Continue IPPT to progress as tolerated. Recommend FWW.      Anticipated Discharge Disposition (PT): home with assist, home with outpatient therapy services

## 2023-08-03 NOTE — PROGRESS NOTES
"          Orthopaedic Surgery Progress Note      LOS: 0 days   Patient Care Team:  Fabian Jauregui MD as PCP - General (Internal Medicine)    POD 1    Subjective     Interval History:   Patient admitted overnight after elective left TKA.  Initially planning on going home, he had dizziness upon ambulation and has had some urinary retention overnight.  He says he feels nauseous this morning.  Minimal pain in the knee.    Objective     Vital Signs:  Temp (24hrs), Av.1 øF (36.7 øC), Min:97.6 øF (36.4 øC), Max:98.7 øF (37.1 øC)    /48 (BP Location: Right arm, Patient Position: Lying)   Pulse 60   Temp 98.6 øF (37 øC) (Oral)   Resp 16   Ht 175.3 cm (69\")   Wt 70.8 kg (156 lb)   SpO2 96%   BMI 23.04 kg/mý     Labs:  Lab Results (last 24 hours)       Procedure Component Value Units Date/Time    Basic Metabolic Panel [[id number]]  (Abnormal) Collected: 23    Specimen: Blood Updated: 23     Glucose 147 mg/dL      BUN 37 mg/dL      Creatinine 1.31 mg/dL      Sodium 138 mmol/L      Potassium 4.8 mmol/L      Chloride 103 mmol/L      CO2 23.0 mmol/L      Calcium 8.6 mg/dL      BUN/Creatinine Ratio 28.2     Anion Gap 12.0 mmol/L      eGFR 57.8 mL/min/1.73     Narrative:      GFR Normal >60  Chronic Kidney Disease <60  Kidney Failure <15    The GFR formula is only valid for adults with stable renal function between ages 18 and 70.    CBC (No Diff) [[id number]]  (Abnormal) Collected: 23    Specimen: Blood Updated: 23     WBC 11.10 10*3/mm3      RBC 3.38 10*6/mm3      Hemoglobin 10.2 g/dL      Hematocrit 31.0 %      MCV 91.7 fL      MCH 30.2 pg      MCHC 32.9 g/dL      RDW 12.3 %      RDW-SD 41.6 fl      MPV 10.4 fL      Platelets 173 10*3/mm3             Physical Exam:  EHL, FHL, gastroc soleus, and tibialis anterior are intact  Toes are pink and warm  Surgical dressing is in place  Palpable dorsalis pedis pulse  Calf is soft and nontender    Postoperative x-ray has been " reviewed and looks acceptable    Assessment & Plan     Postoperative day number 1 status post left total knee arthroplasty.    Labs: Hematocrit 31, platelets 173,000, creatinine 1.31    Pain Control: Acceptable overall    PT and OT     DVT prophylaxis: Begin full dose aspirin daily this morning and continue for 6 weeks    Discharge planning: Anticipate discharge home this morning.  Prescriptions have been called in to the patient's home pharmacy.    We will defer with regards to urinary retention to hospitalist.    Upon discharge, patient will need follow-up with me in 3 weeks' time.  They will need [facility] re[facility] at home program for physical therapy.  Standard Exofin Fusion dressing care instructions.  Do not remove.  DME per case management.    Admission Status:  I believe this patient meets INPATIENT status due to the need for care which can only be reasonably provided in a hospital setting such as aggressive/expedited ancillary services and/or consultation services, the necessity for IV medications, close physician monitoring and/or the possible need for procedures.  In such, I feel patient's risk for adverse outcomes and need for care warrant INPATIENT evaluation and predict the patient's care encounter to likely last beyond 2 midnights.        Parish Head MD  08/03/23  07:59 EDT

## 2023-08-03 NOTE — PROGRESS NOTES
[facility]    Acute pain service Inpatient Progress Note    Patient Name: Roque Bob  :  1950  MRN:  [id number]        Acute Pain  Service Inpatient Progress Note:    Analgesia:Excellent  Pain Score:0/10  LOC: alert and awake  Resp Status: room air  Cardiac: VS stable  Side Effects:None  Catheter Site:clean, dressing intact and dry  Cath type: peripheral nerve cath with ON Q  Infusion rate: Fem/ Add: Basal: 1ml/hr, PIB: 8ml q 8h, PCA: 8ml q 30 min (1mL,8ml, 8ml InfuSystem Pump)  Catheter Plan:Catheter to remain Insitu and Continue catheter infusion rate unchanged  Comments:    010 anterior  3/10 posterior

## 2023-08-03 NOTE — DISCHARGE SUMMARY
Patient Name: Roque Bob  MRN: [id number]  : 1950  DOS: 8/3/2023    Attending: Parish Head,*    Primary Care Provider: Fabian Jauregui MD    Date of Admission:.2023  5:43 AM    Date of Discharge:  8/3/2023    Discharge Diagnosis:     Status post total left knee replacement    Primary osteoarthritis of left knee    Hypertension    Elevated hemoglobin A1c    Arthritis of left knee    Postoperative urinary retention, resolved      Hospital Course    At admit:  Patient is a pleasant 73 y.o. male presented for scheduled surgery by Dr. Head.  He underwent left total knee arthroplasty under spinal anesthesia.  He tolerated surgery well and was admitted for further medical management.  His knee has been painful for about 4 years.  He denies use of assistive device for ambulation or recent falls.     When seen postop he is doing well.  His pain is well controlled.  He denies nausea, shortness of breath or chest pain.  No history of DVT or PE.     After admit:    Patient was provided pain medications as needed for pain control, along with adductor canal nerve block infusion of Ropivacaine.      Adjustments were made to pain medications to optimize postop pain management. Risks and benefits of opiate medications discussed with patient. JEANNINE report was reviewed.    He was seen by PT and OT and has progressed well over his stay.    Pt used an IS for atelectasis prophylaxis and ASA along with mechanicals for DVT prophylaxis.    Home medications were resumed as appropriate, and labs were monitored and remained fairly stable.     With the progress he has made,  is ready for DC home today.    He will have an Infupump ( instructed on it during this admit).    Discussed with patient regarding plan and he shows understanding and agreement.    Patient will have HHPT following discharge.        Procedures Performed  Procedure(s):  TOTAL KNEE ARTHROPLASTY - LEFT       Pertinent Test  "Results:    I reviewed the patient's new clinical results.   Results from last 7 days   Lab Units 23  0344   WBC 10*3/mm3 11.10*   HEMOGLOBIN g/dL 10.2*   HEMATOCRIT % 31.0*   PLATELETS 10*3/mm3 173     Results from last 7 days   Lab Units 23  0344   SODIUM mmol/L 138   POTASSIUM mmol/L 4.8   CHLORIDE mmol/L 103   CO2 mmol/L 23.0   BUN mg/dL 37*   CREATININE mg/dL 1.31*   CALCIUM mg/dL 8.6   GLUCOSE mg/dL 147*     I reviewed the patient's new imaging including images and reports.      Physical therapy    Goal Outcome Evaluation:  Plan of Care Reviewed With: patient, daughter  Progress: improving  Outcome Evaluation: Pt. presents below baseline function w/decreased strength and AROM of L knee and acute pain affecting his ability to safely participate in functional mobility. He performed transfers, ambulated 200' and navigated 4 steps w/contact guard assist. Activity limited by pain and fatigue. Pt. asymptomatic, no buckling or LOB noted. Reviewed knee precautions, HEP. Continue IPPT to progress as tolerated. Recommend FWW.        Anticipated Discharge Disposition (PT): home with assist, home with outpatient therapy services        Discharge Assessment:       Visit Vitals  /60 (BP Location: Left arm, Patient Position: Sitting)   Pulse 59   Temp 98.4 øF (36.9 øC) (Oral)   Resp 18   Ht 175.3 cm (69\")   Wt 70.8 kg (156 lb)   SpO2 99%   BMI 23.04 kg/mý     Temp (24hrs), Av.4 øF (36.9 øC), Min:97.7 øF (36.5 øC), Max:99.2 øF (37.3 øC)      General Appearance:    Alert, cooperative, in no acute distress   Lungs:     Clear to auscultation,respirations regular, even and                   unlabored    Heart:    Regular rhythm and normal rate, normal S1 and S2   Abdomen:     Normal bowel sounds, no masses, no organomegaly, soft        non-tender, non-distended, no guarding, no rebound                 tenderness   Extremities:   CDI dressing surgical knee . ACB cath present, infupump.   Pulses:   Pulses " palpable and equal bilaterally   Skin:   No bleeding, bruising or rash   Neurologic:   Cranial nerves 2 - 12 grossly intact, sensation intact, Flexion and dorsiflexion intact bilateral feet.         Discharge Disposition: Home          Discharge Medications        New Medications        Instructions Start Date   aspirin 325 MG EC tablet   325 mg, Oral, Daily, Begin day after surgery      docusate sodium 100 MG capsule  Commonly known as: Colace   100 mg, Oral, 2 Times Daily PRN      ibuprofen 600 MG tablet  Commonly known as: ADVIL,MOTRIN   600 mg, Oral, Every 6 Hours PRN      ondansetron 4 MG tablet  Commonly known as: Zofran   4 mg, Oral, Every 8 Hours PRN      oxyCODONE 5 MG immediate release tablet  Commonly known as: ROXICODONE   5 mg, Oral, Every 6 Hours PRN      ropivacaine 0.2 % infusion (INFUSYSTEM)  Commonly known as: NAROPIN   1 mL/hr (2 mg/hr), Peripheral Nerve, Continuous             Changes to Medications        Instructions Start Date   acetaminophen 500 MG tablet  Commonly known as: TYLENOL  What changed:   medication strength  how much to take  when to take this  reasons to take this   1,000 mg, Oral, Every 8 Hours             Continue These Medications        Instructions Start Date   lisinopril 20 MG tablet  Commonly known as: PRINIVIL,ZESTRIL   20 mg, Oral, Daily      Potassium Citrate ER 15 MEQ (1620 MG) tablet controlled-release   1 tablet, Oral, 2 Times Daily      rizatriptan MLT 5 MG disintegrating tablet  Commonly known as: MAXALT-MLT   rizatriptan 5 mg disintegrating tablet   Take 1 tablet every day by oral route as needed.             Stop These Medications      meloxicam 7.5 MG tablet  Commonly known as: MOBIC              Discharge Diet: Resume prior.        Activity Instructions       Discharge Activity      Keep incision clean and dry  Cover with ACE wrap when wearing long pants  PT exercises BID when at home (set given by [facility] PT)  Use Walker or Crutches until cleared by outpatient  therapist or HHPT    Range of Motion      0-90    Weight Bearing As Tolerated      Weight Bearing Status      Weight Bearing Status: As Tolerated               Future Appointments   Date Time Provider Department Center   8/24/2023 10:00 AM Parish Head MD [facility]         Dragon disclaimer:  Part of this encounter note is an electronic transcription/translation of spoken language to printed text. The electronic translation of spoken language may permit erroneous, or at times, nonsensical words or phrases to be inadvertently transcribed; Although I have reviewed the note for such errors, some may still exist.       Boogie Pradhan MD  08/03/23  13:36 EDT

## 2023-08-06 LAB
QT INTERVAL: 422 MS
QTC INTERVAL: 376 MS

## 2023-08-24 ENCOUNTER — OFFICE VISIT (OUTPATIENT)
Dept: ORTHOPEDIC SURGERY | Facility: CLINIC | Age: 73
End: 2023-08-24
Payer: MEDICARE

## 2023-08-24 VITALS — TEMPERATURE: 98.7 F

## 2023-08-24 DIAGNOSIS — Z96.652 STATUS POST TOTAL LEFT KNEE REPLACEMENT: Primary | ICD-10-CM

## 2023-08-24 PROCEDURE — 99024 POSTOP FOLLOW-UP VISIT: CPT | Performed by: ORTHOPAEDIC SURGERY

## 2023-08-24 NOTE — PROGRESS NOTES
[facility] [place] [facility] [place] [facility] [place]        Subjective     Post-op Follow-up (3 WEEK S/P TOTAL KNEE ARTHROPLASTY- LEFT )       HPI    Roque Bob is a 73 y.o. male.  Patient is here today for 3-week follow-up after left TKA.  He is doing his physical therapy at [facility] [facility] [employer] twice a week.  Denies chest pain or shortness of breath.  He is off narcotic pain medicine.          Objective      Physical Exam  Temp 98.7 øF (37.1 øC)     There is no height or weight on file to calculate BMI.        Ortho Exam      Lower Extremity:     Inspection and Palpation:      Left knee:  Calf:  Soft and non tender  Pulses:  2+  Ecchymosis:  None  Warmth:  Appropriate  Incision:  Clean, dry, and intact.  Healing appropriately  Assistive device: Rolling walker    ROM:  Left:  Extension: 15    flexion: 90      Functional Testing:  Left:  Straight Leg Raise: 5/5       Imaging Reviewed:  Imaging Results (Last 24 Hours)       Procedure Component Value Units Date/Time    XR Knee 3 View Left [[id number]] Resulted: 08/24/23 1002     Updated: 08/24/23 1002    Narrative:        Knee X-ray    Indication: status-post TKA    Study:  AP, Lateral, and Sunrise views of Left knee    Comparison: Left knee 8/2/2023    Findings:  No signs of acute fracture are visualized  No signs of loosening are appreciated  Components are well aligned    Impression:  Status post Left total knee arthroplasty. No signs of   loosening or fracture.                  Assessment    Assessment:  1. Status post total left knee replacement        Plan:  Status post left TKA--patient is much more stiff than I would have anticipated at this point in the game.  I did like for him to be much more aggressive with his range of motion exercises and physical therapy in general.  I do not want him to have to go through an KIRK given his excellent preoperative range of motion.  He should take pain medication if it helps him complete this therapy but I am doubtful he  will go back on it.  I will see him back in 3 weeks to reassess his motion and hopefully he makes big gains in the next 3 weeks.      Parish Head MD  08/24/23  17:22 EDT      Dictated Utilizing Dragon Dictation.

## 2023-09-14 ENCOUNTER — OFFICE VISIT (OUTPATIENT)
Dept: ORTHOPEDIC SURGERY | Facility: CLINIC | Age: 73
End: 2023-09-14
Payer: MEDICARE

## 2023-09-14 DIAGNOSIS — Z96.652 STATUS POST TOTAL LEFT KNEE REPLACEMENT: Primary | ICD-10-CM

## 2023-09-14 PROCEDURE — 99024 POSTOP FOLLOW-UP VISIT: CPT | Performed by: ORTHOPAEDIC SURGERY

## 2023-09-14 NOTE — PROGRESS NOTES
[facility] [place] [place] [facility] [place]        Subjective     Post-op Follow-up (3 week f/u; 6 weeks s/p TOTAL KNEE ARTHROPLASTY- LEFT 8/2/23)       CHINEDU Bob is a 73 y.o. male.  Patient is here today now 6 weeks out from left TKA on 8/2/2023.  He continues to do physical therapy.  Says he is feeling better and better each week.          Objective      Physical Exam  There were no vitals taken for this visit.    There is no height or weight on file to calculate BMI.        Ortho Exam    Lower Extremity:     Inspection and Palpation:      Left knee:  Calf:  Soft and non tender  Effusion:  None  Pulses:  2+  Warmth:  None  Incision:  Healed     ROM:  Left:  Extension:0    Flexion:120      Deformities/Malalignments/Discrepancies:    Left:  none    Functional Testing:  Left:  Straight Leg Raise: 5/5  Patella Tracking:  Normal      Imaging Reviewed:  Imaging Results (Last 24 Hours)       ** No results found for the last 24 hours. **              Assessment    Assessment:  1. Status post total left knee replacement        Plan:  Status post left TKA--patient doing well overall.  Plan will be for follow-up in 6 weeks.  We have released him to activity as tolerated.  He should continue PT.  Follow-up to reassess motion and strength.  He can discontinue his aspirin now.        Parish Head MD  09/14/23  13:49 EDT      Dictated Utilizing Dragon Dictation.Answers submitted by the patient for this visit:  Primary Reason for Visit (Submitted on 9/12/2023)  What is the primary reason for your visit?: Lower Extremity Injury  Lower Extremity Injury Questionnaire (Submitted on 9/12/2023)  Chief Complaint: Lower extremity pain  Incident occurred: more than 1 week ago  Incident location: other  Injury mechanism: other  Pain location: left knee  Pain - numeric: 5/10  Pain course: improving  tingling: No  inability to bear weight: No  loss of motion: Yes  loss of sensation: No  muscle weakness: No  Foreign body  present: no foreign bodies  Aggravated by: movement, weight bearing

## 2023-10-24 ENCOUNTER — OFFICE VISIT (OUTPATIENT)
Dept: ORTHOPEDIC SURGERY | Facility: CLINIC | Age: 73
End: 2023-10-24
Payer: MEDICARE

## 2023-10-24 DIAGNOSIS — Z96.652 STATUS POST TOTAL LEFT KNEE REPLACEMENT: Primary | ICD-10-CM

## 2023-10-24 PROCEDURE — 99024 POSTOP FOLLOW-UP VISIT: CPT | Performed by: ORTHOPAEDIC SURGERY

## 2023-10-24 RX ORDER — MELOXICAM 7.5 MG/1
1 TABLET ORAL DAILY
COMMUNITY
Start: 2023-08-24

## 2023-10-24 NOTE — PROGRESS NOTES
[facility] Note        Subjective     Post-op Follow-up (6 WEEK FOLLOW UP --12 WEEKS s/p TOTAL KNEE ARTHROPLASTY- LEFT 8/2/23))       CHINEDU Bob is a 73 y.o. male.  Patient is here today now almost 3 months out from left TKA on 8/2/2023.  Patient is doing great overall.  He is back to playing tennis.          Objective      Physical Exam  There were no vitals taken for this visit.    There is no height or weight on file to calculate BMI.        Ortho Exam    Lower Extremity:     Inspection and Palpation:      Left knee:  Calf:  Soft and non tender  Effusion:  None  Pulses:  2+  Warmth:  None  Incision:  Healed     ROM:  Left:  Extension:0    Flexion:120      Deformities/Malalignments/Discrepancies:    Left:  none    Functional Testing:  Left:  Straight Leg Raise: 5/5  Patella Tracking:  Normal      Imaging Reviewed:  Imaging Results (Last 24 Hours)       ** No results found for the last 24 hours. **              Assessment    Assessment:  1. Status post total left knee replacement        Plan:  Status post left TKA--patient doing very well overall.  Follow-up in 3 months.  Continue indefinite antibiotic prophylaxis.      Parish Head MD  10/24/23  16:33 EDT      Dictated Utilizing Dragon Dictation.

## 2024-02-01 ENCOUNTER — OFFICE VISIT (OUTPATIENT)
Dept: ORTHOPEDIC SURGERY | Facility: CLINIC | Age: 74
End: 2024-02-01

## 2024-02-01 VITALS
BODY MASS INDEX: 23.82 KG/M2 | SYSTOLIC BLOOD PRESSURE: 140 MMHG | HEIGHT: 69 IN | DIASTOLIC BLOOD PRESSURE: 84 MMHG | WEIGHT: 160.8 LBS

## 2024-02-01 DIAGNOSIS — Z96.652 STATUS POST TOTAL LEFT KNEE REPLACEMENT: Primary | ICD-10-CM

## 2024-02-01 NOTE — PROGRESS NOTES
"    [facility] [place] [facility] [place] [facility] [place]        Subjective     CC: Follow-up (3 month follow up -- 6 months status post left total knee arthroplasty (DOS 8/2/23) )      CHINEDU Bob is a 73 y.o. male.  Patient returns the office today now 6 months out from left TKA on 8/2/2023.  Patient is doing very well overall.  He is walking 3 miles at a time 3 times a week and also playing tennis.  Says he feels confident in the left knee.  Still gets sore after increased levels of activity.    Overall, patient's symptoms are as above.    ROS:    Constiutional:Pt denies fever, chills, nausea, or vomiting.  MSK:as above        Objective      Past Medical History  Past Medical History:   Diagnosis Date    Arthritis     Hypertension     Knee swelling Left - 1//1/19    Surgery on 8/2/23    Prostate CA     Rotator cuff syndrome Right - 10/1/12 / Left - 1/1/22    Surgery on 2/19/13     Social History     Socioeconomic History    Marital status:    Tobacco Use    Smoking status: Never    Smokeless tobacco: Never   Vaping Use    Vaping Use: Never used   Substance and Sexual Activity    Alcohol use: No    Drug use: No    Sexual activity: Not Currently     Partners: Female          Physical Exam  /84   Ht 175.3 cm (69\")   Wt 72.9 kg (160 lb 12.8 oz)   BMI 23.75 kg/m²     Body mass index is 23.75 kg/m².    Patient is well nourished and well developed.        Ortho Exam    Lower Extremity:     Inspection and Palpation:      Left knee:  Calf:  Soft and non tender  Effusion:  None  Pulses:  2+  Warmth:  None  Incision:  Healed     ROM:  Left:  Extension:0    Flexion:120      Deformities/Malalignments/Discrepancies:    Left:  none    Functional Testing:  Left:  Straight Leg Raise: 5/5  Patella Tracking:  Normal      Imaging/Labs/EMG Reviewed:  Imaging Results (Last 24 Hours)       ** No results found for the last 24 hours. **              Assessment    Assessment:  1. Status post total left knee replacement  "       Plan:  Recommend over the counter anti-inflammatories for pain and/or swelling  Status post left TKA 8/2/2023--patient doing great overall.  See him back in 6 months with an x-ray or sooner if necessary.  Continue indefinite antibiotic prophylaxis.      Parish Head MD  02/01/24  12:56 EST      Dictated Utilizing Dragon Dictation.

## 2024-10-01 ENCOUNTER — OFFICE VISIT (OUTPATIENT)
Dept: ORTHOPEDIC SURGERY | Facility: CLINIC | Age: 74
End: 2024-10-01
Payer: MEDICARE

## 2024-10-01 VITALS
SYSTOLIC BLOOD PRESSURE: 126 MMHG | HEIGHT: 69 IN | DIASTOLIC BLOOD PRESSURE: 74 MMHG | BODY MASS INDEX: 23.46 KG/M2 | WEIGHT: 158.4 LBS

## 2024-10-01 DIAGNOSIS — M75.101 ROTATOR CUFF SYNDROME OF RIGHT SHOULDER: ICD-10-CM

## 2024-10-01 DIAGNOSIS — M25.511 RIGHT SHOULDER PAIN, UNSPECIFIED CHRONICITY: ICD-10-CM

## 2024-10-01 DIAGNOSIS — Z96.652 STATUS POST TOTAL LEFT KNEE REPLACEMENT: Primary | ICD-10-CM

## 2024-10-01 DIAGNOSIS — Z98.890 S/P COMPLETE REPAIR OF ROTATOR CUFF: ICD-10-CM

## 2024-10-01 NOTE — PROGRESS NOTES
"    [facility] – [place] Orth[facility] Surgery Clinic Note        Subjective     CC: Follow-up (8 month follow up -1 year status post left total knee arthroplasty (DOS 8/2/23) )/)      CHINEDU    Roque Bob is a 74 y.o. male.  Patient is here today now 1 year out from robot-assisted left TKA on 8/2/2023.  Patient complains of little bit of soreness from time to time but is able to jog and play tennis.  He says more than anything now his right shoulder bothers him.  Had rotator cuff repair by me while I was practicing in [place].  He says over the last 4 months or so, he has had anterolateral shoulder pain.  Hurts him both at pain and at rest.    Overall, patient's symptoms are as above    ROS:    Constiutional:Pt denies fever, chills, nausea, or vomiting.  MSK:as above        Objective      Past Medical History  Past Medical History:   Diagnosis Date    Arthritis     Hypertension     Knee swelling Left - 1//1/19    Surgery on 8/2/23    Prostate CA     Rotator cuff syndrome Right - 10/1/12 / Left - 1/1/22    Surgery on 2/19/13     Social History     Socioeconomic History    Marital status:    Tobacco Use    Smoking status: Never    Smokeless tobacco: Never   Vaping Use    Vaping status: Never Used   Substance and Sexual Activity    Alcohol use: No    Drug use: No    Sexual activity: Not Currently     Partners: Female          Physical Exam  /74   Ht 175.3 cm (69\")   Wt 71.8 kg (158 lb 6.4 oz)   BMI 23.39 kg/m²     Body mass index is 23.39 kg/m².    Patient is well nourished and well developed.        Ortho Exam    Lower Extremity:     Inspection and Palpation:      Left knee:  Calf:  Soft and non tender  Effusion:  None  Pulses:  2+  Warmth:  None  Incision:  Healed     ROM:  Left:  Extension:0    Flexion:120      Deformities/Malalignments/Discrepancies:    Left:  none    Functional Testing:  Left:  Straight Leg Raise: 5/5  Patella Tracking:  Normal    Musculoskeletal   Upper Extremity   Right Shoulder       Strength " and Tone:    Supraspinatus -5 out of 5 with pain    External Rotators-5/5    Infraspinatus - 5/5    Subscapularis - 5/5    Deltoid - 5/5     Range of Motion      Right Shoulder:    Internal Rotation: ROM - L4    External Rotation: AROM - 70 degrees    Elevation through flexion: AROM - 140 degrees     AC joint:  non tender to palpation    AC joint:  negative crossover       Imaging/Labs/EMG Reviewed and Interpreted:  Imaging Results (Last 24 Hours)       Procedure Component Value Units Date/Time    XR Knee 3+ View With [place] Left [[id number]] Resulted: 10/01/24 1008     Updated: 10/01/24 1009    Narrative:      Knee X-ray    Indication: status-post TKA    Study:  AP, Lateral, and Sunrise views of Left knee    Comparison: Left knee 8/2/2023    Findings:  No signs of acute fracture are visualized  No signs of loosening are appreciated  Components are well aligned    Impression:  Status post Left cemented total knee arthroplasty. No signs   of loosening or fracture.                Assessment    Assessment:  1. Status post total left knee replacement    2. Right shoulder pain, unspecified chronicity    3. Rotator cuff syndrome of right shoulder    4. S/P complete repair of rotator cuff        Plan:  Recommend over the counter anti-inflammatories for pain and/or swelling  Rotator cuff syndrome with right shoulder pain status post rotator cuff repair years ago--plan will be for a course of physical therapy.  He wants to go to a tennis specific physical therapist and therefore generic prescription will be written he will assess his colleagues at [facility] who they recommend  Status post left TKA--patient doing well overall.  Follow-up in year with x-rays or sooner if necessary.  Continue indefinite antibiotic prophylaxis.      Parish Head MD  10/01/24  13:37 EDT      Dictated Utilizing Dragon Dictation.

## 2025-06-03 ENCOUNTER — OFFICE VISIT (OUTPATIENT)
Dept: ORTHOPEDIC SURGERY | Facility: CLINIC | Age: 75
End: 2025-06-03
Payer: MEDICARE

## 2025-06-03 VITALS
HEIGHT: 69 IN | SYSTOLIC BLOOD PRESSURE: 124 MMHG | WEIGHT: 157.4 LBS | BODY MASS INDEX: 23.31 KG/M2 | DIASTOLIC BLOOD PRESSURE: 74 MMHG

## 2025-06-03 DIAGNOSIS — M19.011 PRIMARY OSTEOARTHRITIS OF RIGHT SHOULDER: ICD-10-CM

## 2025-06-03 DIAGNOSIS — M75.101 ROTATOR CUFF SYNDROME OF RIGHT SHOULDER: Primary | ICD-10-CM

## 2025-06-03 RX ORDER — LIDOCAINE HYDROCHLORIDE 10 MG/ML
3 INJECTION, SOLUTION EPIDURAL; INFILTRATION; INTRACAUDAL; PERINEURAL
Status: COMPLETED | OUTPATIENT
Start: 2025-06-03 | End: 2025-06-03

## 2025-06-03 RX ORDER — TRIAMCINOLONE ACETONIDE 40 MG/ML
40 INJECTION, SUSPENSION INTRA-ARTICULAR; INTRAMUSCULAR
Status: COMPLETED | OUTPATIENT
Start: 2025-06-03 | End: 2025-06-03

## 2025-06-03 RX ADMIN — TRIAMCINOLONE ACETONIDE 40 MG: 40 INJECTION, SUSPENSION INTRA-ARTICULAR; INTRAMUSCULAR at 14:09

## 2025-06-03 RX ADMIN — LIDOCAINE HYDROCHLORIDE 3 ML: 10 INJECTION, SOLUTION EPIDURAL; INFILTRATION; INTRACAUDAL; PERINEURAL at 14:09

## 2025-06-03 NOTE — PROGRESS NOTES
Procedure   - Large Joint Arthrocentesis: R glenohumeral on 6/3/2025 2:09 PM  Indications: pain  Details: (23G) needle, posterior approach  Medications: 3 mL lidocaine PF 1% 1 %; 40 mg triamcinolone acetonide 40 MG/ML  Outcome: tolerated well, no immediate complications  Procedure, treatment alternatives, risks and benefits explained, specific risks discussed. Consent was given by the patient. Immediately prior to procedure a time out was called to verify the correct patient, procedure, equipment, support staff and site/side marked as required. Patient was prepped and draped in the usual sterile fashion.

## 2025-06-03 NOTE — PROGRESS NOTES
"    [facility] – [place] [facility] [place]        Subjective     CC: Follow-up (8 month follow up--Rotator cuff syndrome of right shoulder)      HPI    Roque Bob is a 75 y.o. male.  Patient is here today for his right shoulder.  He says that he has noticed progressively that serving in playing tennis has become more difficult.  He has difficulty reaching behind him and overhead.  Underwent rotator cuff repair with me 10 years ago.  Physical therapy has not been all that effective.    Overall, patient's symptoms are as above    ROS:    Constiutional:Pt denies fever, chills, nausea, or vomiting.  MSK:as above        Objective      Past Medical History  Past Medical History:   Diagnosis Date    Arthritis     Hypertension     Knee swelling Left - 1//1/19    Surgery on 8/2/23    Prostate CA     Rotator cuff syndrome Right - 10/1/12 / Left - 1/1/22    Surgery on 2/19/13     Social History     Socioeconomic History    Marital status:    Tobacco Use    Smoking status: Never     Passive exposure: Never    Smokeless tobacco: Never   Vaping Use    Vaping status: Never Used   Substance and Sexual Activity    Alcohol use: No    Drug use: No    Sexual activity: Not Currently     Partners: Female          Physical Exam  /74   Ht 175.3 cm (69.02\")   Wt 71.4 kg (157 lb 6.4 oz)   BMI 23.23 kg/m²     Body mass index is 23.23 kg/m².    Patient is well nourished and well developed.        Ortho Exam  Musculoskeletal   Upper Extremity   Right Shoulder       Strength and Tone:    Supraspinatus -5 out of 5 with pain    External Rotators-5/5    Infraspinatus - 5/5    Subscapularis - 5/5    Deltoid - 5/5     Range of Motion      Right Shoulder:    Internal Rotation: ROM -L5-S1    External Rotation: AROM - 70 degrees    Elevation through flexion: AROM - 140 degrees     AC joint:  non tender to palpation    AC joint:  negative crossover      Imaging/Labs/EMG Reviewed and Interpreted:  Imaging Results (Last 24 Hours)       " Procedure Component Value Units Date/Time    XR Shoulder 2+ View Right [[id number]] Resulted: 06/03/25 1142     Updated: 06/03/25 1142    Narrative:      Right Shoulder X-Ray    Indication: Pain    Study:  Grashey AP, axillary lateral, and scapular Y views    Comparison: none    Findings:  No acute fractures are visualized  No bony lesions are visualized.  Normal soft tissue appearance  AC joint:  Severe hypertrophic joint space narrowing  Glenohumeral joint:  moderate joint space narrowing with osteophytes noted   on the inferior aspect of the humeral head and joint space narrowing   posteriorly seen on the axillary lateral view.  Patient is status post   rotator cuff repair with metallic anchors noted in the humeral head  Acromion type:2      Impression:    No acute bony abnormalities noted  Status post rotator cuff repair  Severe AC joint arthritis  Moderate glenohumeral arthritis                Assessment    Assessment:  1. Rotator cuff syndrome of right shoulder    2. Primary osteoarthritis of right shoulder        Plan:  Recommend over the counter anti-inflammatories for pain and/or swelling  Rotator cuff syndrome right shoulder status post rotator cuff repair--observe for now.  Moderate glenohumeral arthritis--diagnostic and therapeutic injection will be given into the right glenohumeral joint today.  Follow-up in about 3 to 4 months we will see how he is doing overall.  Has an appointment in October for his left knee and we will inquire about his right shoulder when he returns.      Procedure Note:    I discussed with the patient the potential benefits of performing a therapeutic injections as well as potential risks including but not limited to infection, swelling, pain, bleeding, bruising, nerve/vessel damage, skin color changes, transient elevation in blood glucose levels, elevated blood pressure and fat atrophy. After informed consent and after the areas were prepped with chlorhexadine soap, ethyl  chloride was used to numb the skin. Via the anterior approach, 3mL of 1% lidocaine followed by 40mg of Kenalog were each injected into the glenohumeral joint of the right shoulder. The patient tolerated the procedure well. There were no complications. A sterile dressing was placed over the injection sites.        Parish Head MD  06/03/25  14:04 EDT      Dictated Utilizing Dragon Dictation.

## (undated) DEVICE — PIN DRL NOHEAD TROC 3.2X75MM

## (undated) DEVICE — ANTIBACTERIAL UNDYED BRAIDED (POLYGLACTIN 910), SYNTHETIC ABSORBABLE SUTURE: Brand: COATED VICRYL

## (undated) DEVICE — PATIENT RETURN ELECTRODE, SINGLE-USE, CONTACT QUALITY MONITORING, ADULT, WITH 9FT CORD, FOR PATIENTS WEIGING OVER 33LBS. (15KG): Brand: MEGADYNE

## (undated) DEVICE — 450 ML BOTTLE OF 0.05% CHLORHEXIDINE GLUCONATE IN 99.95% STERILE WATER FOR IRRIGATION, USP AND APPLICATOR.: Brand: IRRISEPT ANTIMICROBIAL WOUND LAVAGE

## (undated) DEVICE — CONTAINER,SPECIMEN,OR STERILE,4OZ: Brand: MEDLINE

## (undated) DEVICE — SYR LUERLOK 30CC

## (undated) DEVICE — GLV SURG PREMIERPRO MIC LTX PF SZ8 BRN

## (undated) DEVICE — BLANKT WARM UPPR/BDY ARM/OUT 57X196CM

## (undated) DEVICE — Device

## (undated) DEVICE — SYS CLS SKIN PREMIERPRO EXOFINFUSION 22CM

## (undated) DEVICE — STRYKER PERFORMANCE SERIES SAGITTAL BLADE: Brand: STRYKER PERFORMANCE SERIES

## (undated) DEVICE — BNDG ELAS W/CLIP 6IN 10YD LF STRL

## (undated) DEVICE — KT PUMP INFUBLOCK MDL 2100 PMKITSOLIS

## (undated) DEVICE — TRAP FLD MINIVAC MEGADYNE 100ML

## (undated) DEVICE — PK KN TOTL 10

## (undated) DEVICE — PAD ARMBRD SURG CONVOL 7.5X20X2IN

## (undated) DEVICE — SUT MONOCRYL PLS ANTIB UND 3/0  PS1 27IN

## (undated) DEVICE — TB SXN FRAZIER 12F STRL

## (undated) DEVICE — TBG PENCL TELESCP MEGADYNE SMOKE EVAC 10FT

## (undated) DEVICE — UNDERCAST PADDING: Brand: DEROYAL

## (undated) DEVICE — SCRW HEX PERSONA FML 2.5X25MM PK/2
Type: IMPLANTABLE DEVICE | Site: KNEE | Status: NON-FUNCTIONAL
Removed: 2023-08-02

## (undated) DEVICE — DRSNG PAD ABD 8X10IN STRL

## (undated) DEVICE — DELFI MATCHING LIMB PROTECTION SLEEVES (MLPS) HELP PROTECT THE PATIENT’S LIMB FROM POSSIBLE WRINKLING, PINCHING AND SHEARING OF SKIN AND SOFT TISSUES OF THE LIMB.EACH DELFI MATCHING LIMB PROTECTION SLEEVE IS INTENDED FOR USE WITH A SPECIFIC DELFI TOURNIQUET CUFF. THIS SLEEVE IS SPECIFICALLY FOR THIGH.: Brand: MATCHING LIMB PROTECTION SLEEVES - VARI-FIT